# Patient Record
Sex: MALE | Race: WHITE | Employment: OTHER | URBAN - METROPOLITAN AREA
[De-identification: names, ages, dates, MRNs, and addresses within clinical notes are randomized per-mention and may not be internally consistent; named-entity substitution may affect disease eponyms.]

---

## 2021-05-25 ENCOUNTER — OFFICE VISIT (OUTPATIENT)
Dept: PRIMARY CARE CLINIC | Age: 71
End: 2021-05-25
Payer: MEDICARE

## 2021-05-25 VITALS
DIASTOLIC BLOOD PRESSURE: 65 MMHG | SYSTOLIC BLOOD PRESSURE: 111 MMHG | OXYGEN SATURATION: 97 % | HEART RATE: 70 BPM | HEIGHT: 68 IN | BODY MASS INDEX: 23.31 KG/M2 | RESPIRATION RATE: 18 BRPM | TEMPERATURE: 97.5 F | WEIGHT: 153.8 LBS

## 2021-05-25 DIAGNOSIS — E53.8 B12 DEFICIENCY: ICD-10-CM

## 2021-05-25 DIAGNOSIS — R10.11 RUQ PAIN: ICD-10-CM

## 2021-05-25 DIAGNOSIS — R10.13 EPIGASTRIC PAIN: ICD-10-CM

## 2021-05-25 DIAGNOSIS — E78.00 HYPERCHOLESTEREMIA: Primary | ICD-10-CM

## 2021-05-25 DIAGNOSIS — Z87.898 HISTORY OF ALCOHOL USE: ICD-10-CM

## 2021-05-25 DIAGNOSIS — E03.9 ACQUIRED HYPOTHYROIDISM: ICD-10-CM

## 2021-05-25 DIAGNOSIS — E51.9 VITAMIN B1 DEFICIENCY: ICD-10-CM

## 2021-05-25 DIAGNOSIS — E83.42 HYPOMAGNESEMIA: ICD-10-CM

## 2021-05-25 DIAGNOSIS — F51.01 PRIMARY INSOMNIA: ICD-10-CM

## 2021-05-25 DIAGNOSIS — K29.00 ACUTE GASTRITIS WITHOUT HEMORRHAGE, UNSPECIFIED GASTRITIS TYPE: ICD-10-CM

## 2021-05-25 DIAGNOSIS — E55.9 VITAMIN D DEFICIENCY: ICD-10-CM

## 2021-05-25 DIAGNOSIS — N40.0 BENIGN PROSTATIC HYPERPLASIA, UNSPECIFIED WHETHER LOWER URINARY TRACT SYMPTOMS PRESENT: ICD-10-CM

## 2021-05-25 PROCEDURE — 99204 OFFICE O/P NEW MOD 45 MIN: CPT | Performed by: INTERNAL MEDICINE

## 2021-05-25 PROCEDURE — G8536 NO DOC ELDER MAL SCRN: HCPCS | Performed by: INTERNAL MEDICINE

## 2021-05-25 PROCEDURE — G8420 CALC BMI NORM PARAMETERS: HCPCS | Performed by: INTERNAL MEDICINE

## 2021-05-25 PROCEDURE — G8427 DOCREV CUR MEDS BY ELIG CLIN: HCPCS | Performed by: INTERNAL MEDICINE

## 2021-05-25 PROCEDURE — G8510 SCR DEP NEG, NO PLAN REQD: HCPCS | Performed by: INTERNAL MEDICINE

## 2021-05-25 PROCEDURE — 1101F PT FALLS ASSESS-DOCD LE1/YR: CPT | Performed by: INTERNAL MEDICINE

## 2021-05-25 PROCEDURE — 3017F COLORECTAL CA SCREEN DOC REV: CPT | Performed by: INTERNAL MEDICINE

## 2021-05-25 RX ORDER — PANTOPRAZOLE SODIUM 40 MG/1
40 TABLET, DELAYED RELEASE ORAL DAILY
Qty: 30 TABLET | Refills: 1 | Status: SHIPPED | OUTPATIENT
Start: 2021-05-25 | End: 2021-09-17 | Stop reason: SDUPTHER

## 2021-05-25 NOTE — PROGRESS NOTES
Chief Complaint   Patient presents with    New Patient    Other     pain right side x 2 1/2 months, denies pain at this time        Visit Vitals  Ht 5' 8\" (1.727 m)   Wt 153 lb 12.8 oz (69.8 kg)   BMI 23.39 kg/m²

## 2021-05-25 NOTE — PROGRESS NOTES
Marely Rojas is a 69 y.o.  male and presents with     Chief Complaint   Patient presents with    New Patient    Other     pain right side x 2 1/2 months, denies pain at this time    Cholesterol Problem    Hypothyroidism    Insomnia    Benign Prostatic Hypertrophy     Pt is here to establish care. Pt moved from [facility]. Pt stopped alcohol and also stopped zetia and lipitor on April 20,2021 as he was having lot of muscle aches. Pt has RUQ pain when he eats. Pain is worse when lying down. Pt has been watching keto diet to loose wt. Pt was diagnosed with fatty liver in the past.  Pt has h/o drinking alcohol for 40 years. Pt retired and stopped drinking on May 14th, 2021. Never had EGD. Pt had colonscopy Dec 2019, - normal colonoscopy. Had liver ultrasound - fatty liver    Pt has calcium scoring and later had stress test that was neg. NO h/o CAD. Pt says he tried all kinds of meds for insomnia but it did not work. 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 Pt says he is able to sleep 7-8 hours after taking ambien 12.5 mg at hs. Pt is a Type A person. Pt has been trying keto diet and intermittent fasting to loose wt. Pts mother passed away at 80. Father used to drink alcohol and passed away at 80. NO FH for CAD and stroke. Father had mini strokes. No past medical history on file. No past surgical history on file. Current Outpatient Medications   Medication Sig    pantoprazole (PROTONIX) 40 mg tablet Take 1 Tablet by mouth daily. No current facility-administered medications for this visit.      Health Maintenance   Topic Date Due    Hepatitis C Screening  Never done    COVID-19 Vaccine (1) Never done    Lipid Screen  Never done    Colorectal Cancer Screening Combo  Never done    Pneumococcal 65+ years (2 of 2 - PPSV23) 09/03/2014    Medicare Yearly Exam  05/21/2021    Flu Vaccine (Season Ended) 09/01/2021    DTaP/Tdap/Td series (2 - Td) 02/20/2028    Shingrix Vaccine Age 50>  Completed       There is no immunization history on file for this patient. No LMP for male patient. Allergies and Intolerances:   No Known Allergies    Family History:   Family History   Problem Relation Age of Onset    Cancer Mother         non hodgkin's lymphoma       Social History:   He  reports that he has never smoked. He has never used smokeless tobacco.  He  reports previous alcohol use.             Review of Systems:   General: negative for - chills, fatigue, fever, weight change  Psych: negative for - anxiety, depression, irritability or mood swings  ENT: negative for - headaches, hearing change, nasal congestion, oral lesions, sneezing or sore throat  Heme/ Lymph: negative for - bleeding problems, bruising, pallor or swollen lymph nodes  Endo: negative for - hot flashes, polydipsia/polyuria or temperature intolerance  Resp: negative for - cough, shortness of breath or wheezing  CV: negative for - chest pain, edema or palpitations  GI: negative for - abdominal pain, change in bowel habits, constipation, diarrhea or nausea/vomiting  : negative for - dysuria, hematuria, incontinence, pelvic pain or vulvar/vaginal symptoms  MSK: negative for - joint pain, joint swelling or muscle pain  Neuro: negative for - confusion, headaches, seizures or weakness  Derm: negative for - dry skin, hair changes, rash or skin lesion changes          Physical:   Vitals:   Vitals:    05/25/21 0819   BP: 111/65   Pulse: 70   Resp: 18   Temp: 97.5 °F (36.4 °C)   TempSrc: Temporal   SpO2: 97%   Weight: 153 lb 12.8 oz (69.8 kg)   Height: 5' 8\" (1.727 m)           Exam:   HEENT- atraumatic,normocephalic, awake, oriented, well nourished  Neck - supple,no enlarged lymph nodes, no JVD, no thyromegaly  Chest- CTA, no rhonchi, no crackles  Heart- rrr, no murmurs / gallop/rub  Abdomen- soft,BS+,NT, no hepatosplenomegaly, mild right upper quadrant tenderness  Ext - no c/c/edema   Neuro- no focal deficits. Power 5/5 all extremities  Skin - warm,dry, no obvious rashes. Review of Data:   LABS:   No results found for: WBC, HGB, HCT, PLT, HGBEXT, HCTEXT, PLTEXT, HGBEXT, HCTEXT, PLTEXT  No results found for: NA, K, CL, CO2, GLU, BUN, CREA  No results found for: CHOL, CHOLX, CHLST, CHOLV, HDL, HDLP, LDL, LDLC, DLDLP, TGLX, TRIGL, TRIGP  No components found for: GPT        Impression / Plan:        ICD-10-CM ICD-9-CM    1. Hypercholesteremia  E78.00 272.0 CBC WITH AUTOMATED DIFF      METABOLIC PANEL, COMPREHENSIVE      LIPID PANEL   2. Primary insomnia  F51.01 307.42    3. Acquired hypothyroidism  E03.9 244.9 TSH 3RD GENERATION      T3, FREE      T4, FREE   4. Benign prostatic hyperplasia, unspecified whether lower urinary tract symptoms present  N40.0 600.00    5. History of alcohol use  Z87.898 V11.3    6. Hypomagnesemia  E83.42 275.2 MAGNESIUM   7. B12 deficiency  E53.8 266.2 VITAMIN B12 & FOLATE   8. Vitamin D deficiency  E55.9 268.9 VITAMIN D, 25 HYDROXY   9. Vitamin B1 deficiency  E51.9 265.1 VITAMIN B1, WHOLE BLOOD   10. RUQ pain  R10.11 789.01 US ABD COMP      LIPASE   11. Acute gastritis without hemorrhage, unspecified gastritis type  K29.00 535.00 pantoprazole (PROTONIX) 40 mg tablet   12. Epigastric pain  R10.13 789.06 URINALYSIS W/ RFLX MICROSCOPIC     Asked patient to hold vitamin B 1, magnesium, vitamin K, vitamin D, B12, co-Q10 supplements. Also asked patient to avoid statins as well as Zetia for now. Will repeat labs in 6 weeks and get baseline results. May need to restart patient on a lower dose of statins. If patient does develop symptoms of body aches and muscle aches, may consider checking creatinine kinase and aldolase levels at that point. Insomnia-patient has been on Ambien 12.5 mg for a long time. He informs that he has tried all other medications to help him sleep and does not help.   Informed patient that as per guidelines, due to his age he should be on a lower dose of Ambien to prevent any side effects. Patient mentioned that he will try cutting there is tablet in half and see if he can still be able to sleep. Right upper quadrant abdominal pain-rule out gallstones, fatty liver. However given long history of alcohol use, patient may have gastritis or duodenitis or even ulcer formation. Asked patient to try Protonix empirically. May need endoscopy if symptoms do not resolve. Explained to patient risk benefits of the medications. Advised patient to stop meds if having any side effects. Pt verbalized understanding of the instructions. I have discussed the diagnosis with the patient and the intended plan as seen in the above orders. The patient has received an after-visit summary and questions were answered concerning future plans. I have discussed medication side effects and warnings with the patient as well. I have reviewed the plan of care with the patient, accepted their input and they are in agreement with the treatment goals. Reviewed plan of care. Patient has provided input and agrees with goals. Follow-up and Dispositions    · Return in about 6 weeks (around 7/6/2021).          Ralph Reyes MD

## 2021-05-28 ENCOUNTER — TELEPHONE (OUTPATIENT)
Dept: PRIMARY CARE CLINIC | Age: 71
End: 2021-05-28

## 2021-05-28 NOTE — TELEPHONE ENCOUNTER
Patient wants to see a dermatologist to check for moles on body and podiatrist for bone spur left foot middle toe.  Patient stated he does not need a referral just the name of the specialist.

## 2021-05-28 NOTE — TELEPHONE ENCOUNTER
----- Message from Michael Cannon sent at 5/27/2021  7:46 AM EDT -----  Regarding: Dr. Cai/Telephone  General Message/Vendor Calls    Caller's first and last name:[facility]      Reason for call:name of podiatrist and dermatologist      Callback required yes/no and why:yes      Best contact number(s):[phone]      Details to clarify the request:Pt requested a call back with the name of a podiatrist and dermatologist recommended by the doctor.       Michael Cannon

## 2021-06-29 ENCOUNTER — HOSPITAL ENCOUNTER (OUTPATIENT)
Dept: ULTRASOUND IMAGING | Age: 71
Discharge: HOME | End: 2021-06-29
Attending: INTERNAL MEDICINE
Payer: MEDICARE

## 2021-06-29 DIAGNOSIS — R10.11 RUQ PAIN: ICD-10-CM

## 2021-06-29 PROCEDURE — 76705 ECHO EXAM OF ABDOMEN: CPT

## 2021-06-29 NOTE — PROGRESS NOTES
Ultrasound shows diffuse fatty infiltration of the liver. I would recommend medicated labs and I have ordered. Need to make sure he does not have liver disease including cirrhosis. There are 2 small renal cysts -we will observe.   Please make follow-up appointment in few weeks after reviewing the labs

## 2021-09-17 ENCOUNTER — OFFICE VISIT (OUTPATIENT)
Dept: PRIMARY CARE CLINIC | Age: 71
End: 2021-09-17
Payer: MEDICARE

## 2021-09-17 VITALS
RESPIRATION RATE: 18 BRPM | WEIGHT: 156 LBS | HEART RATE: 80 BPM | BODY MASS INDEX: 23.64 KG/M2 | OXYGEN SATURATION: 98 % | HEIGHT: 68 IN | SYSTOLIC BLOOD PRESSURE: 134 MMHG | TEMPERATURE: 97.5 F | DIASTOLIC BLOOD PRESSURE: 77 MMHG

## 2021-09-17 DIAGNOSIS — K29.00 ACUTE GASTRITIS WITHOUT HEMORRHAGE, UNSPECIFIED GASTRITIS TYPE: ICD-10-CM

## 2021-09-17 DIAGNOSIS — E78.00 HYPERCHOLESTEREMIA: Primary | ICD-10-CM

## 2021-09-17 DIAGNOSIS — F51.01 PRIMARY INSOMNIA: ICD-10-CM

## 2021-09-17 DIAGNOSIS — E03.9 ACQUIRED HYPOTHYROIDISM: ICD-10-CM

## 2021-09-17 PROCEDURE — 99214 OFFICE O/P EST MOD 30 MIN: CPT | Performed by: INTERNAL MEDICINE

## 2021-09-17 PROCEDURE — G8432 DEP SCR NOT DOC, RNG: HCPCS | Performed by: INTERNAL MEDICINE

## 2021-09-17 PROCEDURE — G8427 DOCREV CUR MEDS BY ELIG CLIN: HCPCS | Performed by: INTERNAL MEDICINE

## 2021-09-17 PROCEDURE — 1101F PT FALLS ASSESS-DOCD LE1/YR: CPT | Performed by: INTERNAL MEDICINE

## 2021-09-17 PROCEDURE — G8536 NO DOC ELDER MAL SCRN: HCPCS | Performed by: INTERNAL MEDICINE

## 2021-09-17 PROCEDURE — G8420 CALC BMI NORM PARAMETERS: HCPCS | Performed by: INTERNAL MEDICINE

## 2021-09-17 PROCEDURE — 3017F COLORECTAL CA SCREEN DOC REV: CPT | Performed by: INTERNAL MEDICINE

## 2021-09-17 RX ORDER — LEVOTHYROXINE AND LIOTHYRONINE 38; 9 UG/1; UG/1
60 TABLET ORAL DAILY
Qty: 90 TABLET | Refills: 1 | Status: SHIPPED | OUTPATIENT
Start: 2021-09-17 | End: 2022-07-28 | Stop reason: SDUPTHER

## 2021-09-17 RX ORDER — ZOLPIDEM TARTRATE 6.25 MG/1
6.25 TABLET, FILM COATED, EXTENDED RELEASE ORAL
Qty: 90 TABLET | Refills: 1 | Status: SHIPPED | OUTPATIENT
Start: 2021-09-17 | End: 2022-07-28 | Stop reason: SDUPTHER

## 2021-09-17 RX ORDER — EZETIMIBE 10 MG/1
10 TABLET ORAL DAILY
Qty: 90 TABLET | Refills: 1 | Status: SHIPPED | OUTPATIENT
Start: 2021-09-17

## 2021-09-17 RX ORDER — PANTOPRAZOLE SODIUM 40 MG/1
40 TABLET, DELAYED RELEASE ORAL DAILY
Qty: 90 TABLET | Refills: 1 | Status: SHIPPED | OUTPATIENT
Start: 2021-09-17 | End: 2022-07-28 | Stop reason: SDUPTHER

## 2021-09-17 NOTE — PROGRESS NOTES
Chief Complaint   Patient presents with    Abnormal Lab Results        Visit Vitals  /77 (BP 1 Location: Right upper arm, BP Patient Position: Sitting)   Pulse 80   Temp 97.5 °F (36.4 °C) (Temporal)   Resp 18   Ht 5' 8\" (1.727 m)   Wt 156 lb (70.8 kg)   SpO2 98%   BMI 23.72 kg/m²        1. Have you been to the ER, urgent care clinic since your last visit? Hospitalized since your last visit? No    2. Have you seen or consulted any other health care providers outside of the [address] [place] [address] since your last visit? Include any pap smears or colon screening.  No

## 2021-09-17 NOTE — PROGRESS NOTES
Bassam Elizalde is a 66 y.o.  male and presents with     Chief Complaint   Patient presents with    Abnormal Lab Results    Cholesterol Problem    Hypothyroidism     Pt is here for follow up. Pt had side effects  To statins. Pt used to be on statins and zetia. Pt reduced the dose of levothyroxine from 90 to 60 mcg po daily recently  Patient is TRYING fasting to lower his cholesterol. He is willing to take Zetia. Patient has tried melatonin over-the-counter to help him sleep. He says sometimes it helps sometimes it does help. He used to take Ambien 12.5 mg daily. He cut it in half and feels like it still works. He is requesting a refill on the [employer] . He has been on it for many years. He is willing to take the lower dose. No past medical history on file. No past surgical history on file. Current Outpatient Medications   Medication Sig    ezetimibe (ZETIA) 10 mg tablet Take 1 Tablet by mouth daily.  thyroid, Pork, (NP Thyroid) 60 mg tablet Take 1 Tablet by mouth daily.  zolpidem CR (Ambien CR) 6.25 mg tablet Take 1 Tablet by mouth nightly as needed for Sleep. Max Daily Amount: 6.25 mg.  pantoprazole (PROTONIX) 40 mg tablet Take 1 Tablet by mouth daily. No current facility-administered medications for this visit. Health Maintenance   Topic Date Due    COVID-19 Vaccine (1) Never done    Colorectal Cancer Screening Combo  Never done    Pneumococcal 65+ years (2 of 2 - PPSV23) 09/03/2014    Medicare Yearly Exam  Never done    Flu Vaccine (1) 09/01/2021    Lipid Screen  09/15/2026    DTaP/Tdap/Td series (2 - Td or Tdap) 02/20/2028    Hepatitis C Screening  Completed    Shingrix Vaccine Age 50>  Completed       There is no immunization history on file for this patient. No LMP for male patient.         Allergies and Intolerances:   No Known Allergies    Family History:   Family History   Problem Relation Age of Onset    Cancer Mother         non hodgkin's lymphoma Social History:   He  reports that he has never smoked. He has never used smokeless tobacco.  He  reports previous alcohol use. Review of Systems:   General: negative for - chills, fatigue, fever, weight change  Psych: negative for - anxiety, depression, irritability or mood swings  ENT: negative for - headaches, hearing change, nasal congestion, oral lesions, sneezing or sore throat  Heme/ Lymph: negative for - bleeding problems, bruising, pallor or swollen lymph nodes  Endo: negative for - hot flashes, polydipsia/polyuria or temperature intolerance  Resp: negative for - cough, shortness of breath or wheezing  CV: negative for - chest pain, edema or palpitations  GI: negative for - abdominal pain, change in bowel habits, constipation, diarrhea or nausea/vomiting  : negative for - dysuria, hematuria, incontinence, pelvic pain or vulvar/vaginal symptoms  MSK: negative for - joint pain, joint swelling or muscle pain  Neuro: negative for - confusion, headaches, seizures or weakness  Derm: negative for - dry skin, hair changes, rash or skin lesion changes          Physical:   Vitals:   Vitals:    09/17/21 0758   BP: 134/77   Pulse: 80   Resp: 18   Temp: 97.5 °F (36.4 °C)   TempSrc: Temporal   SpO2: 98%   Weight: 156 lb (70.8 kg)   Height: 5' 8\" (1.727 m)           Exam:   HEENT- atraumatic,normocephalic, awake, oriented, well nourished  Neck - supple,no enlarged lymph nodes, no JVD, no thyromegaly  Chest- CTA, no rhonchi, no crackles  Heart- rrr, no murmurs / gallop/rub  Abdomen- soft,BS+,NT, no hepatosplenomegaly  Ext - no c/c/edema   Neuro- no focal deficits. Power 5/5 all extremities  Skin - warm,dry, no obvious rashes.           Review of Data:   LABS:   Lab Results   Component Value Date/Time    WBC 3.8 (L) 09/15/2021 07:08 AM    HGB 14.4 09/15/2021 07:08 AM    HCT 43.8 09/15/2021 07:08 AM    PLATELET 930 40/45/1467 07:08 AM     Lab Results   Component Value Date/Time    Sodium 140 09/15/2021 07:08 AM    Potassium 4.1 09/15/2021 07:08 AM    Chloride 108 09/15/2021 07:08 AM    CO2 27 09/15/2021 07:08 AM    Glucose 95 09/15/2021 07:08 AM    BUN 14 09/15/2021 07:08 AM    Creatinine 0.89 09/15/2021 07:08 AM     Lab Results   Component Value Date/Time    Cholesterol, total 261 (H) 09/15/2021 07:08 AM    HDL Cholesterol 58 09/15/2021 07:08 AM    LDL, calculated 186.6 (H) 09/15/2021 07:08 AM    Triglyceride 82 09/15/2021 07:08 AM     No components found for: GPT        Impression / Plan:        ICD-10-CM ICD-9-CM    1. Hypercholesteremia  E78.00 272.0 ezetimibe (ZETIA) 10 mg tablet      LIPID PANEL      TSH 3RD GENERATION      T4, FREE      METABOLIC PANEL, COMPREHENSIVE   2. Primary insomnia  F51.01 307.42 zolpidem CR (Ambien CR) 6.25 mg tablet   3. Acquired hypothyroidism  E03.9 244.9 thyroid, Pork, (NP Thyroid) 60 mg tablet   4. Acute gastritis without hemorrhage, unspecified gastritis type  K29.00 535.00 pantoprazole (PROTONIX) 40 mg tablet         Explained to patient risk benefits of the medications. Advised patient to stop meds if having any side effects. Pt verbalized understanding of the instructions. I have discussed the diagnosis with the patient and the intended plan as seen in the above orders. The patient has received an after-visit summary and questions were answered concerning future plans. I have discussed medication side effects and warnings with the patient as well. I have reviewed the plan of care with the patient, accepted their input and they are in agreement with the treatment goals. Reviewed plan of care. Patient has provided input and agrees with goals. Follow-up and Dispositions    · Return in about 4 months (around 1/17/2022).          Amelie Abdul MD

## 2022-01-21 DIAGNOSIS — F51.01 PRIMARY INSOMNIA: ICD-10-CM

## 2022-01-21 RX ORDER — ZOLPIDEM TARTRATE 6.25 MG/1
TABLET, FILM COATED, EXTENDED RELEASE ORAL
Qty: 90 TABLET | OUTPATIENT
Start: 2022-01-21

## 2022-01-21 NOTE — TELEPHONE ENCOUNTER
Called pt, pt states the pharmacy sent this request in error. He states he doesn't need anything at this time.

## 2022-06-22 ENCOUNTER — HOSPITAL ENCOUNTER (EMERGENCY)
Age: 72
Discharge: LEFT BEFORE DISCHARGE | End: 2022-06-22
Attending: STUDENT IN AN ORGANIZED HEALTH CARE EDUCATION/TRAINING PROGRAM
Payer: MEDICARE

## 2022-06-22 ENCOUNTER — APPOINTMENT (OUTPATIENT)
Dept: CT IMAGING | Age: 72
End: 2022-06-22
Attending: STUDENT IN AN ORGANIZED HEALTH CARE EDUCATION/TRAINING PROGRAM
Payer: MEDICARE

## 2022-06-22 VITALS
OXYGEN SATURATION: 97 % | RESPIRATION RATE: 17 BRPM | TEMPERATURE: 97.5 F | DIASTOLIC BLOOD PRESSURE: 80 MMHG | SYSTOLIC BLOOD PRESSURE: 164 MMHG | BODY MASS INDEX: 24.3 KG/M2 | WEIGHT: 159.83 LBS | HEART RATE: 90 BPM

## 2022-06-22 DIAGNOSIS — G45.4 TRANSIENT GLOBAL AMNESIA: Primary | ICD-10-CM

## 2022-06-22 LAB
ALBUMIN SERPL-MCNC: 3.8 G/DL (ref 3.5–5)
ALBUMIN/GLOB SERPL: 1.2 {RATIO} (ref 1.1–2.2)
ALP SERPL-CCNC: 73 U/L (ref 45–117)
ALT SERPL-CCNC: 81 U/L (ref 12–78)
ANION GAP SERPL CALC-SCNC: 8 MMOL/L (ref 5–15)
AST SERPL-CCNC: 47 U/L (ref 15–37)
ATRIAL RATE: 74 BPM
BASOPHILS # BLD: 0 K/UL (ref 0–0.1)
BASOPHILS NFR BLD: 1 % (ref 0–1)
BILIRUB SERPL-MCNC: 0.6 MG/DL (ref 0.2–1)
BUN SERPL-MCNC: 13 MG/DL (ref 6–20)
BUN/CREAT SERPL: 12 (ref 12–20)
CALCIUM SERPL-MCNC: 8.4 MG/DL (ref 8.5–10.1)
CALCULATED P AXIS, ECG09: 79 DEGREES
CALCULATED R AXIS, ECG10: -51 DEGREES
CALCULATED T AXIS, ECG11: 51 DEGREES
CHLORIDE SERPL-SCNC: 105 MMOL/L (ref 97–108)
CO2 SERPL-SCNC: 27 MMOL/L (ref 21–32)
CREAT SERPL-MCNC: 1.07 MG/DL (ref 0.7–1.3)
DIAGNOSIS, 93000: NORMAL
DIFFERENTIAL METHOD BLD: ABNORMAL
EOSINOPHIL # BLD: 0 K/UL (ref 0–0.4)
EOSINOPHIL NFR BLD: 1 % (ref 0–7)
ERYTHROCYTE [DISTWIDTH] IN BLOOD BY AUTOMATED COUNT: 14.3 % (ref 11.5–14.5)
ETHANOL SERPL-MCNC: <10 MG/DL
GLOBULIN SER CALC-MCNC: 3.1 G/DL (ref 2–4)
GLUCOSE BLD STRIP.AUTO-MCNC: 102 MG/DL (ref 65–117)
GLUCOSE SERPL-MCNC: 102 MG/DL (ref 65–100)
HCT VFR BLD AUTO: 43 % (ref 36.6–50.3)
HGB BLD-MCNC: 14.5 G/DL (ref 12.1–17)
IMM GRANULOCYTES # BLD AUTO: 0 K/UL (ref 0–0.04)
IMM GRANULOCYTES NFR BLD AUTO: 1 % (ref 0–0.5)
LYMPHOCYTES # BLD: 1.5 K/UL (ref 0.8–3.5)
LYMPHOCYTES NFR BLD: 37 % (ref 12–49)
MCH RBC QN AUTO: 31.4 PG (ref 26–34)
MCHC RBC AUTO-ENTMCNC: 33.7 G/DL (ref 30–36.5)
MCV RBC AUTO: 93.1 FL (ref 80–99)
MONOCYTES # BLD: 0.4 K/UL (ref 0–1)
MONOCYTES NFR BLD: 9 % (ref 5–13)
NEUTS SEG # BLD: 2.1 K/UL (ref 1.8–8)
NEUTS SEG NFR BLD: 51 % (ref 32–75)
NRBC # BLD: 0 K/UL (ref 0–0.01)
NRBC BLD-RTO: 0 PER 100 WBC
P-R INTERVAL, ECG05: 138 MS
PLATELET # BLD AUTO: 146 K/UL (ref 150–400)
PMV BLD AUTO: 10.5 FL (ref 8.9–12.9)
POTASSIUM SERPL-SCNC: 3.5 MMOL/L (ref 3.5–5.1)
PROT SERPL-MCNC: 6.9 G/DL (ref 6.4–8.2)
Q-T INTERVAL, ECG07: 408 MS
QRS DURATION, ECG06: 90 MS
QTC CALCULATION (BEZET), ECG08: 452 MS
RBC # BLD AUTO: 4.62 M/UL (ref 4.1–5.7)
RBC MORPH BLD: ABNORMAL
SERVICE CMNT-IMP: NORMAL
SODIUM SERPL-SCNC: 140 MMOL/L (ref 136–145)
VENTRICULAR RATE, ECG03: 74 BPM
WBC # BLD AUTO: 4 K/UL (ref 4.1–11.1)

## 2022-06-22 PROCEDURE — 70450 CT HEAD/BRAIN W/O DYE: CPT

## 2022-06-22 PROCEDURE — 93005 ELECTROCARDIOGRAM TRACING: CPT

## 2022-06-22 PROCEDURE — 74011250636 HC RX REV CODE- 250/636: Performed by: STUDENT IN AN ORGANIZED HEALTH CARE EDUCATION/TRAINING PROGRAM

## 2022-06-22 PROCEDURE — 36415 COLL VENOUS BLD VENIPUNCTURE: CPT

## 2022-06-22 PROCEDURE — 82962 GLUCOSE BLOOD TEST: CPT

## 2022-06-22 PROCEDURE — 99285 EMERGENCY DEPT VISIT HI MDM: CPT

## 2022-06-22 PROCEDURE — 80053 COMPREHEN METABOLIC PANEL: CPT

## 2022-06-22 PROCEDURE — 82077 ASSAY SPEC XCP UR&BREATH IA: CPT

## 2022-06-22 PROCEDURE — 96374 THER/PROPH/DIAG INJ IV PUSH: CPT

## 2022-06-22 PROCEDURE — 85025 COMPLETE CBC W/AUTO DIFF WBC: CPT

## 2022-06-22 RX ORDER — LORAZEPAM 2 MG/ML
1 INJECTION, SOLUTION INTRAMUSCULAR; INTRAVENOUS ONCE
Status: COMPLETED | OUTPATIENT
Start: 2022-06-22 | End: 2022-06-22

## 2022-06-22 RX ADMIN — LORAZEPAM 1 MG: 2 INJECTION, SOLUTION INTRAMUSCULAR; INTRAVENOUS at 11:24

## 2022-06-22 NOTE — ED PROVIDER NOTES
Patient is a 74-year-old male presented emergency department for memory loss. Patient's wife who is bedside states that he got up at approximately 3 AM which is earlier than normal for him normally gets up at 5 AM they are having coffee this morning she noticed that he kept repeating questions not knowing where items were in the house seemed acutely confused patient was not somnolent but just kept repeating questions over and over. Patient presents here last well-known was at approximate 5 AM Code S level 1 called patient went emergently to CT for CT head Noncon. Teleneurology has seen and evaluated the patient feels like this is transient global amnesia recommends admission for the patient. Wife denies patient recently being ill or recent falls. Patient is very interactive and talkative in no acute distress however patient continues to ask the same questions over and over patient unable to recall day of the week, month, year.            Past Medical History:   Diagnosis Date    GERD (gastroesophageal reflux disease)        Past Surgical History:   Procedure Laterality Date    HX HERNIA REPAIR      inguinal         Family History:   Problem Relation Age of Onset    Cancer Mother         non hodgkin's lymphoma       Social History     Socioeconomic History    Marital status:      Spouse name: Not on file    Number of children: Not on file    Years of education: Not on file    Highest education level: Not on file   Occupational History    Not on file   Tobacco Use    Smoking status: Never Smoker    Smokeless tobacco: Never Used   Vaping Use    Vaping Use: Never used   Substance and Sexual Activity    Alcohol use: Not Currently    Drug use: Never    Sexual activity: Yes     Partners: Female   Other Topics Concern    Not on file   Social History Narrative    Not on file     Social Determinants of Health     Financial Resource Strain:     Difficulty of Paying Living Expenses: Not on file Food Insecurity:     Worried About Running Out of Food in the Last Year: Not on file    Cesario of Food in the Last Year: Not on file   Transportation Needs:     Lack of Transportation (Medical): Not on file    Lack of Transportation (Non-Medical): Not on file   Physical Activity:     Days of Exercise per Week: Not on file    Minutes of Exercise per Session: Not on file   Stress:     Feeling of Stress : Not on file   Social Connections:     Frequency of Communication with Friends and Family: Not on file    Frequency of Social Gatherings with Friends and Family: Not on file    Attends [employer] Services: Not on file    Active Member of [address] [place] [address] [employer] or Organizations: Not on file    Attends Club or Organization Meetings: Not on file    Marital Status: Not on file   Intimate Partner Violence:     Fear of Current or Ex-Partner: Not on file    Emotionally Abused: Not on file    Physically Abused: Not on file    Sexually Abused: Not on file   Housing Stability:     Unable to Pay for Housing in the Last Year: Not on file    Number of [place] in the Last Year: Not on file    Unstable Housing in the Last Year: Not on file         ALLERGIES: Patient has no known allergies. Review of Systems   Unable to perform ROS: Mental status change       Vitals:    06/22/22 1023 06/22/22 1108 06/22/22 1138 06/22/22 1208   BP: (!) 142/80 (!) 160/87 (!) 149/85 (!) 164/80   Pulse: 82 77 96 90   Resp: 13 17 17 17   Temp:    97.5 °F (36.4 °C)   SpO2: 96% 97% 97% 97%   Weight:                Physical Exam  Vitals and nursing note reviewed. Constitutional:       General: He is not in acute distress. Appearance: He is well-developed. He is not ill-appearing. HENT:      Head: Normocephalic and atraumatic. Eyes:      Extraocular Movements: Extraocular movements intact. Pupils: Pupils are equal, round, and reactive to light. Cardiovascular:      Rate and Rhythm: Normal rate and regular rhythm.       Heart sounds: Normal heart sounds. Pulmonary:      Effort: Pulmonary effort is normal.      Breath sounds: Normal breath sounds. Abdominal:      General: Bowel sounds are normal.      Palpations: Abdomen is soft. Musculoskeletal:      Cervical back: Normal range of motion and neck supple. Skin:     General: Skin is warm and dry. Neurological:      Mental Status: He is alert. He is confused. GCS: GCS eye subscore is 4. GCS verbal subscore is 5. GCS motor subscore is 6. Cranial Nerves: Cranial nerves are intact. Sensory: Sensation is intact. Motor: Motor function is intact. Coordination: Coordination is intact. Gait: Gait is intact. Psychiatric:         Attention and Perception: Attention normal.         Mood and Affect: Mood is anxious. Behavior: Behavior is hyperactive. Cognition and Memory: Memory is impaired. He exhibits impaired recent memory. MDM  Number of Diagnoses or Management Options  Transient global amnesia  Diagnosis management comments: Is a 78-year-old male present emergency department with concerns of transient global amnesia. Code S level 1 called patient received CT head noncontrast showing age-indeterminate lacunar infarct otherwise no acute abnormalities. Discussed with patient and family admission patient declining admission due to patient's mental status change do not feel like patient is able to decline however patient's wife who is POA states that she wants to take him home to evaluate and observe him at home. Discussed at great length with family that admission would be safer for the patient as we can continue to monitor as well as obtain MRI, possible EEG and neurology to see the patient patient's wife still declines prefers to take patient home. Procedures      Total critical care time spent exclusive of procedures:  52 min.

## 2022-06-22 NOTE — ED NOTES
Daughter of the patient came out of the room asking when the provider will be on the screen. Staff now attempting to call teleneurologist to ensure they are aware of the patient and need for evaluation.  Teleneuro reported there

## 2022-06-22 NOTE — ED NOTES
Daughter of the patient came out of the room multiple times asking when the teleneurologist would be evaluating her father. The patient's daughter was advised that someone should be coming on the screen shortly. Pt resting on stretcher, advised that the teleneurologist should be coming on the screen shortly to perform his assessment. No other needs at this time. Pt speaking clearly and verbalized understanding by saying \"OK, no problem.  Thank you\"

## 2022-06-22 NOTE — ED NOTES
Pt's wife in room and reports waking up this morning between [id number] and was acting normal and suddenly at 0500 pt \"forgot everything that happened in the morning\". Level 1 code S initiated. Dr. Thiago Puentes at bedside. 0945: emergency line code S level 1 initiated. Pt taken to CT stroke.

## 2022-06-22 NOTE — ED NOTES
Pt and wife/daughter wanting to leave AMA, Dr. Thiago Puentes spoke with pt and family and they would still like to leave AMA. Provided with discharge paperwork and copy of lab work.

## 2022-07-28 ENCOUNTER — OFFICE VISIT (OUTPATIENT)
Dept: PRIMARY CARE CLINIC | Age: 72
End: 2022-07-28
Payer: MEDICARE

## 2022-07-28 VITALS
DIASTOLIC BLOOD PRESSURE: 82 MMHG | BODY MASS INDEX: 24.07 KG/M2 | HEIGHT: 68 IN | TEMPERATURE: 96.8 F | HEART RATE: 76 BPM | OXYGEN SATURATION: 97 % | SYSTOLIC BLOOD PRESSURE: 139 MMHG | WEIGHT: 158.8 LBS | RESPIRATION RATE: 16 BRPM

## 2022-07-28 DIAGNOSIS — K29.00 ACUTE GASTRITIS WITHOUT HEMORRHAGE, UNSPECIFIED GASTRITIS TYPE: ICD-10-CM

## 2022-07-28 DIAGNOSIS — E78.00 HYPERCHOLESTEREMIA: ICD-10-CM

## 2022-07-28 DIAGNOSIS — N40.0 BENIGN PROSTATIC HYPERPLASIA, UNSPECIFIED WHETHER LOWER URINARY TRACT SYMPTOMS PRESENT: ICD-10-CM

## 2022-07-28 DIAGNOSIS — Z97.4 DOES USE HEARING AID: ICD-10-CM

## 2022-07-28 DIAGNOSIS — F51.01 PRIMARY INSOMNIA: ICD-10-CM

## 2022-07-28 DIAGNOSIS — I63.81 LACUNAR INFARCTION (HCC): ICD-10-CM

## 2022-07-28 DIAGNOSIS — Z12.5 PROSTATE CANCER SCREENING: ICD-10-CM

## 2022-07-28 DIAGNOSIS — E03.9 ACQUIRED HYPOTHYROIDISM: ICD-10-CM

## 2022-07-28 DIAGNOSIS — Z00.00 MEDICARE ANNUAL WELLNESS VISIT, INITIAL: Primary | ICD-10-CM

## 2022-07-28 PROCEDURE — 3017F COLORECTAL CA SCREEN DOC REV: CPT | Performed by: INTERNAL MEDICINE

## 2022-07-28 PROCEDURE — 99212 OFFICE O/P EST SF 10 MIN: CPT | Performed by: INTERNAL MEDICINE

## 2022-07-28 PROCEDURE — G8420 CALC BMI NORM PARAMETERS: HCPCS | Performed by: INTERNAL MEDICINE

## 2022-07-28 PROCEDURE — 1101F PT FALLS ASSESS-DOCD LE1/YR: CPT | Performed by: INTERNAL MEDICINE

## 2022-07-28 PROCEDURE — 1123F ACP DISCUSS/DSCN MKR DOCD: CPT | Performed by: INTERNAL MEDICINE

## 2022-07-28 PROCEDURE — G8432 DEP SCR NOT DOC, RNG: HCPCS | Performed by: INTERNAL MEDICINE

## 2022-07-28 PROCEDURE — G8536 NO DOC ELDER MAL SCRN: HCPCS | Performed by: INTERNAL MEDICINE

## 2022-07-28 PROCEDURE — G8427 DOCREV CUR MEDS BY ELIG CLIN: HCPCS | Performed by: INTERNAL MEDICINE

## 2022-07-28 PROCEDURE — G0438 PPPS, INITIAL VISIT: HCPCS | Performed by: INTERNAL MEDICINE

## 2022-07-28 RX ORDER — ZOLPIDEM TARTRATE 6.25 MG/1
6.25 TABLET, FILM COATED, EXTENDED RELEASE ORAL
Qty: 90 TABLET | Refills: 1 | Status: SHIPPED | OUTPATIENT
Start: 2022-07-28

## 2022-07-28 RX ORDER — FINASTERIDE 5 MG/1
5 TABLET, FILM COATED ORAL DAILY
Qty: 90 TABLET | Refills: 3 | Status: SHIPPED | OUTPATIENT
Start: 2022-07-28

## 2022-07-28 RX ORDER — LEVOTHYROXINE AND LIOTHYRONINE 38; 9 UG/1; UG/1
60 TABLET ORAL DAILY
Qty: 90 TABLET | Refills: 1 | Status: SHIPPED | OUTPATIENT
Start: 2022-07-28

## 2022-07-28 RX ORDER — FINASTERIDE 5 MG/1
5 TABLET, FILM COATED ORAL DAILY
COMMUNITY
End: 2022-07-28 | Stop reason: SDUPTHER

## 2022-07-28 RX ORDER — PANTOPRAZOLE SODIUM 40 MG/1
40 TABLET, DELAYED RELEASE ORAL DAILY
Qty: 90 TABLET | Refills: 1 | Status: SHIPPED | OUTPATIENT
Start: 2022-07-28

## 2022-07-28 RX ORDER — ASPIRIN 81 MG/1
81 TABLET ORAL DAILY
Qty: 90 TABLET | Refills: 3 | Status: SHIPPED | OUTPATIENT
Start: 2022-07-28

## 2022-07-28 NOTE — PROGRESS NOTES
Faith Nicolas is a 66 y.o.  male and presents with     Chief Complaint   Patient presents with    Annual Wellness Visit     Patient is due for Medicare wellness visit. He also needs refills on his medications. He was recently seen in the ER for an episode of transient memory loss. He was felt to have transient global amnesia    However CT of the brain showed small lacunar infarct. Patient did some lab results and cholesterol is improved. Patient has some reservations about starting a statin. Past Medical History:   Diagnosis Date    GERD (gastroesophageal reflux disease)      Past Surgical History:   Procedure Laterality Date    HX HERNIA REPAIR      inguinal     Current Outpatient Medications   Medication Sig    finasteride (PROSCAR) 5 mg tablet Take 1 Tablet by mouth in the morning. thyroid, Pork, (NP Thyroid) 60 mg tablet Take 1 Tablet by mouth in the morning. zolpidem CR (Ambien CR) 6.25 mg tablet Take 1 Tablet by mouth nightly as needed for Sleep. Max Daily Amount: 6.25 mg.    pantoprazole (PROTONIX) 40 mg tablet Take 1 Tablet by mouth in the morning. aspirin delayed-release 81 mg tablet Take 1 Tablet by mouth in the morning.    ezetimibe (ZETIA) 10 mg tablet Take 1 Tablet by mouth daily. No current facility-administered medications for this visit. Health Maintenance   Topic Date Due    COVID-19 Vaccine (1) Never done    Pneumococcal 65+ years (2 - PPSV23 or PCV20) 03/25/2016    Flu Vaccine (1) 09/01/2022    Depression Screen  07/25/2023    Medicare Yearly Exam  07/29/2023    Colorectal Cancer Screening Combo  07/10/2024    Lipid Screen  07/25/2027    DTaP/Tdap/Td series (2 - Td or Tdap) 02/20/2028    Hepatitis C Screening  Completed    Shingrix Vaccine Age 50>  Completed       There is no immunization history on file for this patient. No LMP for male patient.         Allergies and Intolerances:   No Known Allergies    Family History:   Family History   Problem Relation Age of Onset Cancer Mother         non hodgkin's lymphoma       Social History:   He  reports that he has never smoked. He has never used smokeless tobacco.  He  reports that he does not currently use alcohol. Review of Systems:   General: negative for - chills, fatigue, fever, weight change  Psych: negative for - anxiety, depression, irritability or mood swings  ENT: negative for - headaches, hearing change, nasal congestion, oral lesions, sneezing or sore throat  Heme/ Lymph: negative for - bleeding problems, bruising, pallor or swollen lymph nodes  Endo: negative for - hot flashes, polydipsia/polyuria or temperature intolerance  Resp: negative for - cough, shortness of breath or wheezing  CV: negative for - chest pain, edema or palpitations  GI: negative for - abdominal pain, change in bowel habits, constipation, diarrhea or nausea/vomiting  : negative for - dysuria, hematuria, incontinence, pelvic pain or vulvar/vaginal symptoms  MSK: negative for - joint pain, joint swelling or muscle pain  Neuro: negative for - confusion, headaches, seizures or weakness  Derm: negative for - dry skin, hair changes, rash or skin lesion changes          Physical:   Vitals:   Vitals:    07/28/22 1436   BP: 139/82   Pulse: 76   Resp: 16   Temp: 96.8 °F (36 °C)   TempSrc: Temporal   SpO2: 97%   Weight: 158 lb 12.8 oz (72 kg)   Height: 5' 8\" (1.727 m)           Exam:   HEENT- atraumatic,normocephalic, awake, oriented, well nourished  Neck - supple,no enlarged lymph nodes, no JVD, no thyromegaly  Chest- CTA, no rhonchi, no crackles  Heart- rrr, no murmurs / gallop/rub  Abdomen- soft,BS+,NT, no hepatosplenomegaly  Ext - no c/c/edema   Neuro- no focal deficits. Power 5/5 all extremities  Skin - warm,dry, no obvious rashes.           Review of Data:   LABS:   Lab Results   Component Value Date/Time    WBC 4.0 (L) 06/22/2022 10:05 AM    HGB 14.5 06/22/2022 10:05 AM    HCT 43.0 06/22/2022 10:05 AM    PLATELET 188 (L) 10/63/9177 10:05 AM Lab Results   Component Value Date/Time    Sodium 141 07/25/2022 07:02 AM    Potassium 4.0 07/25/2022 07:02 AM    Chloride 109 (H) 07/25/2022 07:02 AM    CO2 24 07/25/2022 07:02 AM    Glucose 99 07/25/2022 07:02 AM    BUN 15 07/25/2022 07:02 AM    Creatinine 0.90 07/25/2022 07:02 AM     Lab Results   Component Value Date/Time    Cholesterol, total 202 (H) 07/25/2022 07:02 AM    HDL Cholesterol 56 07/25/2022 07:02 AM    LDL, calculated 131.8 (H) 07/25/2022 07:02 AM    Triglyceride 71 07/25/2022 07:02 AM     No components found for: GPT        Impression / Plan:        ICD-10-CM ICD-9-CM    1. Medicare annual wellness visit, initial  Z00.00 V70.0       2. Prostate cancer screening  Z12.5 V76.44 PSA SCREENING (SCREENING)      3. Primary insomnia  F51.01 307.42 zolpidem CR (Ambien CR) 6.25 mg tablet      4. Hypercholesteremia  E78.00 272.0       5. Acquired hypothyroidism  E03.9 244.9 thyroid, Pork, (NP Thyroid) 60 mg tablet      6. Acute gastritis without hemorrhage, unspecified gastritis type  K29.00 535.00 pantoprazole (PROTONIX) 40 mg tablet      7. Benign prostatic hyperplasia, unspecified whether lower urinary tract symptoms present  N40.0 600.00 finasteride (PROSCAR) 5 mg tablet      8. Does use hearing aid  Z97.4 V45.89       9. Lacunar infarction ([facility] [address]  I63.81 434.91 aspirin delayed-release 81 mg tablet      REFERRAL TO NEUROLOGY        Hypercholesterolemia-patient wants to hold off on statins, recommended that given he had a infarct in the brain it would be advisable to start a statin, patient wants to think about it    Lacunar infarct in the brain as seen on CT scan-add aspirin 81 mg daily. Patient referred to neurology      Explained to patient risk benefits of the medications. Advised patient to stop meds if having any side effects. Pt verbalized understanding of the instructions. I have discussed the diagnosis with the patient and the intended plan as seen in the above orders.   The patient has received an after-visit summary and questions were answered concerning future plans. I have discussed medication side effects and warnings with the patient as well. I have reviewed the plan of care with the patient, accepted their input and they are in agreement with the treatment goals. Reviewed plan of care. Patient has provided input and agrees with goals. Follow-up and Dispositions    Return in about 1 year (around 7/28/2023). Jerone Krabbe, MD        ----------------------------------------------------    (AWV) The Initial Medicare Annual Wellness Exam PROGRESS NOTE    This is an Initial Medicare Annual Wellness Exam (AWV) (Performed 12 months after IPPE or effective date of Medicare Part B enrollment, Once in a lifetime)    I have reviewed the patient's medical history in detail and updated the computerized patient record. Radha Larkin is a 66 y.o.  male and presents for an annual wellness exam       There are no problems to display for this patient. ROS   Negative except none    All other systems reviewed and are negative. History     Past Medical History:   Diagnosis Date    GERD (gastroesophageal reflux disease)       Past Surgical History:   Procedure Laterality Date    HX HERNIA REPAIR      inguinal     Current Outpatient Medications   Medication Sig Dispense Refill    finasteride (PROSCAR) 5 mg tablet Take 1 Tablet by mouth in the morning. 90 Tablet 3    thyroid, Pork, (NP Thyroid) 60 mg tablet Take 1 Tablet by mouth in the morning. 90 Tablet 1    zolpidem CR (Ambien CR) 6.25 mg tablet Take 1 Tablet by mouth nightly as needed for Sleep. Max Daily Amount: 6.25 mg. 90 Tablet 1    pantoprazole (PROTONIX) 40 mg tablet Take 1 Tablet by mouth in the morning. 90 Tablet 1    aspirin delayed-release 81 mg tablet Take 1 Tablet by mouth in the morning. 90 Tablet 3    ezetimibe (ZETIA) 10 mg tablet Take 1 Tablet by mouth daily.  90 Tablet 1     No Known Allergies  Family History   Problem Relation Age of Onset    Cancer Mother         non hodgkin's lymphoma     Social History     Tobacco Use    Smoking status: Never    Smokeless tobacco: Never   Substance Use Topics    Alcohol use: Not Currently     There is no problem list on file for this patient. Health Maintenance History  Immunizations reviewed, dtap uptodate , pneumovaxuptodate, flu, zosteruptodate  colonoscopy:uptodate,         Depression Risk Factor Screening:      Patient Health Questionnaire (PHQ-2)   Over the last 2 weeks, how often have you been bothered by any of the following problems? Little interest or pleasure in doing things? Not at all. [0]  Feeling down, depressed, or hopeless? Not at all. [0]    Total Score: 0/6  PHQ-2 Assessment Scoring:   A score of 2 or more requires further screening with the PHQ-9    Alcohol Risk Factor Screening:     Women: On any occasion during the past 3 months, have you had more than 3 drinks containing alcohol? Do you average more than 7 drinks per week? Men: On any occasion during the past 3 months, have you had more than 4 drinks containing alcohol? Do you average more than 14 drinks per week? Functional Ability and Level of Safety:     Hearing Loss    Hearing is good. The patient wears hearing aids. Activities of Daily Living   Self-care. Requires assistance with: no ADLs    Fall Risk   No fall risk factors    Abuse Screen   Patient is not abused  None    Examination   Physical Examination  Vitals:    07/28/22 1436   BP: 139/82   Pulse: 76   Resp: 16   Temp: 96.8 °F (36 °C)   TempSrc: Temporal   SpO2: 97%   Weight: 158 lb 12.8 oz (72 kg)   Height: 5' 8\" (1.727 m)   PainSc:   0 - No pain      Body mass index is 24.15 kg/m².      Evaluation of Cognitive Function:  Mood/affect happy  Appearance:none  Family member/caregiver input:none    alert, well appearing, and in no distress    Patient Care Team:  Veronica Uribe MD as PCP - General (Internal Medicine Physician)  Isaiah Prado MD as PCP - [facility] Empaneled Provider    End-of-life planning  Advanced Directive in the case than an injury or illness causes the patient to be unable to make health care decisions    Health Care Directive or Living Will: no    Advice/Referrals/Counselling/Plan:   Education and counseling provided:  Are appropriate based on today's review and evaluation  Include in education list (weight loss, physical activity, smoking cessation, fall prevention, and nutrition)  current treatment plan is effective, no change in therapy. Brief written plan, checklist    I have discussed the diagnosis with the patient and the intended plan as seen in the above orders. The patient has received an after-visit summary and questions were answered concerning future plans. I have discussed medication side effects and warnings with the patient as well. I have reviewed the plan of care with the patient, accepted their input and they are in agreement with the treatment goals. Follow-up and Dispositions    Return in about 1 year (around 7/28/2023).            ____________________________________________________________

## 2022-07-28 NOTE — PROGRESS NOTES
Chief Complaint   Patient presents with    Annual Wellness Visit       Visit Vitals  /82 (BP 1 Location: Left upper arm, BP Patient Position: Sitting, BP Cuff Size: Small adult)   Pulse 76   Temp 96.8 °F (36 °C) (Temporal)   Resp 16   Ht 5' 8\" (1.727 m)   Wt 158 lb 12.8 oz (72 kg)   SpO2 97%   BMI 24.15 kg/m²     1. \"Have you been to the ER, urgent care clinic since your last visit? Hospitalized since your last visit? \" Confusion     2. \"Have you seen or consulted any other health care providers outside of the [address] [place] [address] since your last visit? \" Dentist

## 2022-07-31 DIAGNOSIS — R97.20 ELEVATED PSA: Primary | ICD-10-CM

## 2022-11-14 ENCOUNTER — OFFICE VISIT (OUTPATIENT)
Dept: NEUROLOGY | Age: 72
End: 2022-11-14
Payer: MEDICARE

## 2022-11-14 VITALS
BODY MASS INDEX: 23.87 KG/M2 | OXYGEN SATURATION: 98 % | SYSTOLIC BLOOD PRESSURE: 162 MMHG | WEIGHT: 157 LBS | HEART RATE: 84 BPM | DIASTOLIC BLOOD PRESSURE: 84 MMHG | RESPIRATION RATE: 18 BRPM

## 2022-11-14 DIAGNOSIS — R90.89 ABNORMAL CT OF BRAIN: Primary | ICD-10-CM

## 2022-11-14 DIAGNOSIS — G45.4 TRANSIENT GLOBAL AMNESIA: ICD-10-CM

## 2022-11-14 PROCEDURE — 99204 OFFICE O/P NEW MOD 45 MIN: CPT | Performed by: PSYCHIATRY & NEUROLOGY

## 2022-11-14 PROCEDURE — 1123F ACP DISCUSS/DSCN MKR DOCD: CPT | Performed by: PSYCHIATRY & NEUROLOGY

## 2022-11-14 PROCEDURE — 1101F PT FALLS ASSESS-DOCD LE1/YR: CPT | Performed by: PSYCHIATRY & NEUROLOGY

## 2022-11-14 PROCEDURE — G8432 DEP SCR NOT DOC, RNG: HCPCS | Performed by: PSYCHIATRY & NEUROLOGY

## 2022-11-14 PROCEDURE — G8427 DOCREV CUR MEDS BY ELIG CLIN: HCPCS | Performed by: PSYCHIATRY & NEUROLOGY

## 2022-11-14 PROCEDURE — G8536 NO DOC ELDER MAL SCRN: HCPCS | Performed by: PSYCHIATRY & NEUROLOGY

## 2022-11-14 PROCEDURE — G8420 CALC BMI NORM PARAMETERS: HCPCS | Performed by: PSYCHIATRY & NEUROLOGY

## 2022-11-14 PROCEDURE — 3017F COLORECTAL CA SCREEN DOC REV: CPT | Performed by: PSYCHIATRY & NEUROLOGY

## 2022-11-14 NOTE — PROGRESS NOTES
Chief Complaint   Patient presents with    New Patient     Referrered by PCP, concerned about possibly having \"mini strokez\" back in June 2022. HISTORY OF PRESENT ILLNESS  Lux Allen is a 67 y.o. male who was seen in the emergency department back in June of this year with what was suspected to be an episode of transient global amnesia. He woke up fine that day but within a couple of hours started acting confused. Per his wife, he could not recognize the objects in their house, could not remember what day or date it was and kept asking the same question over and over again. He was responding \"I don't know\" to most questions. He was taken to the emergency department and does not remember any of this. Symptoms persisted for about 7 to 8 hours and then his memory came back and phases. There was no associated headache, changes in vision, speech or focal motor or sensory deficits. He had CT brain done which showed an old lacunar infarct in the left periventricular white matter. He has been taking aspirin since then and also takes Zetia. Used to be on a statin but not anymore. Past Medical History:   Diagnosis Date    GERD (gastroesophageal reflux disease)      Current Outpatient Medications   Medication Sig    finasteride (PROSCAR) 5 mg tablet Take 1 Tablet by mouth in the morning. thyroid, Pork, (NP Thyroid) 60 mg tablet Take 1 Tablet by mouth in the morning. zolpidem CR (Ambien CR) 6.25 mg tablet Take 1 Tablet by mouth nightly as needed for Sleep. Max Daily Amount: 6.25 mg.    pantoprazole (PROTONIX) 40 mg tablet Take 1 Tablet by mouth in the morning. aspirin delayed-release 81 mg tablet Take 1 Tablet by mouth in the morning.    ezetimibe (ZETIA) 10 mg tablet Take 1 Tablet by mouth daily. No current facility-administered medications for this visit.      No Known Allergies  Family History   Problem Relation Age of Onset    Cancer Mother         non hodgkin's lymphoma     Social History Tobacco Use    Smoking status: Never    Smokeless tobacco: Never   Vaping Use    Vaping Use: Never used   Substance Use Topics    Alcohol use: Not Currently    Drug use: Never     Past Surgical History:   Procedure Laterality Date    HX HERNIA REPAIR      inguinal         REVIEW OF SYSTEMS  Review of Systems - History obtained from the patient  Psychological ROS: negative  ENT ROS: negative  Hematological and Lymphatic ROS: negative  Endocrine ROS: negative  Respiratory ROS: no cough, shortness of breath, or wheezing  Cardiovascular ROS: no chest pain or dyspnea on exertion  Gastrointestinal ROS: no abdominal pain, change in bowel habits, or black or bloody stools  Genito-Urinary ROS: no dysuria, trouble voiding, or hematuria  Musculoskeletal ROS: negative  Dermatological ROS: negative      PHYSICAL EXAMINATION:    Visit Vitals  BP (!) 162/84   Pulse 84   Resp 18   Wt 157 lb (71.2 kg)   SpO2 98%   BMI 23.87 kg/m²     General:  Well groomed individual in no acute distress. Neck: Supple, nontender, no bruits, no pain with resistance to active range of motion. Heart: Regular rate and rhythm. Normal S1S2. Lungs:  Equal chest expansion, no cough, no wheeze  Musculoskeletal:  Extremities revealed no edema and had full range of motion of joints. Psych:  Good mood and bright affect    NEUROLOGICAL EXAMINATION:     Mental Status:   Alert and oriented to person, place, and time with recent and remote memory intact. Attention span and concentration are normal. Speech is fluent. Cranial Nerves:    II, III, IV, VI:  Visual acuity grossly intact. Visual fields are normal.    Pupils are equal, round, and reactive to light. Extra-ocular movements are full and fluid. Fundoscopic exam was benign, no ptosis or nystagmus. V-XII: Hearing is grossly intact. Facial features are symmetric, with normal sensation and strength. The palate rises symmetrically and the tongue protrudes midline.   Sternocleidomastoids 5/5.      Motor Examination: Normal tone, bulk, and strength. 5/5 muscle strength throughout. No cogwheel rigidity or clonus present. Sensory exam:  Normal throughout to pinprick, temperature, and vibration sense. Normal proprioception. Coordination:  Finger to nose and rapid arm movement testing was normal.   No resting or intention tremor    Gait and Station:  Steady while walking on toes, heels, and with tandem walking. Normal arm swing. No Rhomberg or pronator drift. No muscle wasting or fasiculations noted. Reflexes:  DTRs 2+ throughout. Toes downgoing. LABS / IMAGING  CT Results (most recent):  Results from Hospital Encounter encounter on 06/22/22    CT CODE NEURO HEAD WO CONTRAST    Narrative  EXAM:  CT CODE NEURO HEAD WO CONTRAST    INDICATION: Sudden memory loss    COMPARISON: None. TECHNIQUE: Axial noncontrast head CT from foramen magnum to vertex. Coronal and  sagittal reformatted images were obtained. CT dose reduction was achieved  through use of a standardized protocol tailored for this examination and  automatic exposure control for dose modulation. FINDINGS:  The ventricles and sulci are age-appropriate without hydrocephalus. There is no mass effect or midline shift. There is no intracranial hemorrhage or  extra-axial fluid collection. There is no evidence for acute territorial  infarct. There is a small age-indeterminate lacunar infarct in the left  periventricular white matter. The basal cisterns are patent. The osseous structures are intact. The visualized paranasal sinuses and left  mastoid air cells are clear. There is nonspecific opacification of the right  mastoid air cells. Impression  No acute intracranial abnormality. Small age indeterminate lacunar infarct in  the left periventricular white matter. Nonspecific right mastoid air cell  opacification.     Imaging reviewed    Lab Results   Component Value Date/Time    Cholesterol, total 202 (H) 07/25/2022 07:02 AM    HDL Cholesterol 56 07/25/2022 07:02 AM    LDL-C, External 76 09/14/2020 12:00 AM    LDL, calculated 131.8 (H) 07/25/2022 07:02 AM    VLDL, calculated 14.2 07/25/2022 07:02 AM    Triglyceride 71 07/25/2022 07:02 AM    CHOL/HDL Ratio 3.6 07/25/2022 07:02 AM     Lab Results   Component Value Date/Time    WBC 4.0 (L) 06/22/2022 10:05 AM    HGB 14.5 06/22/2022 10:05 AM    HCT 43.0 06/22/2022 10:05 AM    PLATELET 586 (L) 76/58/8896 10:05 AM    MCV 93.1 06/22/2022 10:05 AM     Lab Results   Component Value Date/Time    Sodium 141 07/25/2022 07:02 AM    Potassium 4.0 07/25/2022 07:02 AM    Chloride 109 (H) 07/25/2022 07:02 AM    CO2 24 07/25/2022 07:02 AM    Anion gap 8 07/25/2022 07:02 AM    Glucose 99 07/25/2022 07:02 AM    BUN 15 07/25/2022 07:02 AM    Creatinine 0.90 07/25/2022 07:02 AM    BUN/Creatinine ratio 17 07/25/2022 07:02 AM    GFR est AA >60 07/25/2022 07:02 AM    GFR est non-AA >60 07/25/2022 07:02 AM    Calcium 9.2 07/25/2022 07:02 AM    Bilirubin, total 0.7 07/25/2022 07:02 AM    Alk. phosphatase 53 07/25/2022 07:02 AM    Protein, total 6.7 07/25/2022 07:02 AM    Albumin 4.0 07/25/2022 07:02 AM    Globulin 2.7 07/25/2022 07:02 AM    A-G Ratio 1.5 07/25/2022 07:02 AM    ALT (SGPT) 61 07/25/2022 07:02 AM    AST (SGOT) 30 07/25/2022 07:02 AM       ASSESSMENT    ICD-10-CM ICD-9-CM    1. Abnormal CT of brain  R90.89 793.0 MRI BRAIN WO CONT      EEG      DUPLEX CAROTID BILATERAL      2. Transient global amnesia  G45.4 437.7 MRI BRAIN WO CONT      EEG      DUPLEX CAROTID BILATERAL          DISCUSSION  Mr. Everette Guerra likely had an episode of transient global amnesia back in June of this year. The pathophysiology of this condition was reviewed and other mimics were discussed including TIA/focal seizure. There was a small age-indeterminate lacunar infarct seen in the left periventricular white matter.   I have recommended MRI scan of the brain for further clarification  Check carotid duplex  Check EEG  His blood pressure was noted to be elevated at 162/84 on the initial reading and the repeat reading was 143/84. Patient reports lower readings at home and should continue to monitor  May continue aspirin  I will review the above tests once completed and if they are unremarkable, no changes to management    Thank you for allowing me to participate in the care of Mr. Peri De La Rosa. Please feel free to contact me if you have any questions. I will be happy to follow to follow him along with you.       Adolph Kramer MD  Diplomate, American Board of Psychiatry & Neurology (Neurology)  [place] Board of Psychiatry & Neurology (Clinical Neurophysiology)  Diplomate, American Board of Electrodiagnostic Medicine

## 2022-11-28 ENCOUNTER — HOSPITAL ENCOUNTER (OUTPATIENT)
Dept: VASCULAR SURGERY | Age: 72
Discharge: HOME | End: 2022-11-28
Attending: PSYCHIATRY & NEUROLOGY
Payer: MEDICARE

## 2022-11-28 ENCOUNTER — HOSPITAL ENCOUNTER (OUTPATIENT)
Dept: NEUROLOGY | Age: 72
Discharge: HOME | End: 2022-11-28
Attending: PSYCHIATRY & NEUROLOGY
Payer: MEDICARE

## 2022-11-28 DIAGNOSIS — R90.89 ABNORMAL CT OF BRAIN: ICD-10-CM

## 2022-11-28 DIAGNOSIS — G45.4 TRANSIENT GLOBAL AMNESIA: ICD-10-CM

## 2022-11-28 LAB
LEFT CCA DIST DIAS: 23.7 CM/S
LEFT CCA DIST SYS: 67.7 CM/S
LEFT CCA PROX DIAS: 14.9 CM/S
LEFT CCA PROX SYS: 76.5 CM/S
LEFT ECA DIAS: 12.7 CM/S
LEFT ECA SYS: 61.1 CM/S
LEFT ICA DIST DIAS: 23.9 CM/S
LEFT ICA DIST SYS: 58.2 CM/S
LEFT ICA MID DIAS: 25.8 CM/S
LEFT ICA MID SYS: 61.6 CM/S
LEFT ICA PROX DIAS: 18.8 CM/S
LEFT ICA PROX SYS: 58.1 CM/S
LEFT ICA/CCA SYS: 0.9 NO UNITS
LEFT VERTEBRAL DIAS: 13.7 CM/S
LEFT VERTEBRAL SYS: 42.6 CM/S
RIGHT CCA DIST DIAS: 17.7 CM/S
RIGHT CCA DIST SYS: 67.3 CM/S
RIGHT CCA PROX DIAS: 16.4 CM/S
RIGHT CCA PROX SYS: 73.8 CM/S
RIGHT ECA DIAS: 17.1 CM/S
RIGHT ECA SYS: 71 CM/S
RIGHT ICA DIST DIAS: 26.7 CM/S
RIGHT ICA DIST SYS: 67.7 CM/S
RIGHT ICA MID DIAS: 17.5 CM/S
RIGHT ICA MID SYS: 46 CM/S
RIGHT ICA PROX DIAS: 10.2 CM/S
RIGHT ICA PROX SYS: 29.3 CM/S
RIGHT ICA/CCA SYS: 1 NO UNITS
RIGHT VERTEBRAL DIAS: 10.1 CM/S
RIGHT VERTEBRAL SYS: 42.4 CM/S

## 2022-11-28 PROCEDURE — 95816 EEG AWAKE AND DROWSY: CPT

## 2022-11-28 PROCEDURE — 93880 EXTRACRANIAL BILAT STUDY: CPT

## 2022-12-02 ENCOUNTER — HOSPITAL ENCOUNTER (OUTPATIENT)
Dept: MRI IMAGING | Age: 72
End: 2022-12-02
Attending: PSYCHIATRY & NEUROLOGY
Payer: MEDICARE

## 2022-12-02 DIAGNOSIS — R90.89 ABNORMAL CT OF BRAIN: ICD-10-CM

## 2022-12-02 DIAGNOSIS — G45.4 TRANSIENT GLOBAL AMNESIA: ICD-10-CM

## 2022-12-02 PROCEDURE — 70551 MRI BRAIN STEM W/O DYE: CPT

## 2022-12-09 NOTE — PROGRESS NOTES
Please inform that MRI of the brain showed white matter changes and a small old stroke as seen on previous imaging. Nothing new or active. Carotid duplex was negative. He should continue aspirin and Zetia as he is doing.

## 2022-12-12 NOTE — PROCEDURES
[address]   EEG    Name:  Kumar Garcia  MR#:  [id number]  :  1950  ACCOUNT #:  [de-identified]  DATE OF SERVICE:  2022      REQUESTING PHYSICIAN:  Liz Rosado MD    HISTORY:  The patient is a 69-year-old male who is being evaluated after an episode of transient global amnesia lasting 7-8 hours back in 2022. Brain imaging showed lacunar infarct in the left periventricular white matter. DESCRIPTION:  This is an 18-channel EEG performed on an awake, drowsy, and asleep patient. During wakefulness, the dominant posterior background rhythm consists of symmetric, well-modulated, medium voltage rhythms in the 8-9 Hz frequency range. Faster frequencies are seen in the frontal areas. Drowsiness is characterized by slowing and vertex waves in the central region. Sleep spindles were also seen for a brief period. Photic stimulation elicits a symmetric driving response. Hyperventilation did not produce any abnormalities. EEG SUMMARY:  Normal study. CLINICAL INTERPRETATION:  This is a normal EEG during awake, drowsy, and asleep states of the patient. No lateralizing or epileptiform features were noted.       MD GHAZAL [id number]  D:  2022 12:10  T:  2022 20:35  JOB #:  [id number]

## 2022-12-14 ENCOUNTER — TELEPHONE (OUTPATIENT)
Dept: NEUROLOGY | Age: 72
End: 2022-12-14

## 2022-12-14 NOTE — TELEPHONE ENCOUNTER
----- Message from Jag Lowry MD sent at 12/9/2022  1:02 PM EST -----  Please inform that MRI of the brain showed white matter changes and a small old stroke as seen on previous imaging. Nothing new or active. Carotid duplex was negative. He should continue aspirin and Zetia as he is doing.

## 2022-12-14 NOTE — TELEPHONE ENCOUNTER
Spoke with patient, contact verified. Patient made aware of MRI results per MD response. Verbalized understanding. Patient did request EEG results as well.

## 2023-01-17 DIAGNOSIS — E78.00 HYPERCHOLESTEREMIA: ICD-10-CM

## 2023-01-18 RX ORDER — EZETIMIBE 10 MG/1
10 TABLET ORAL DAILY
Qty: 90 TABLET | Refills: 1 | Status: SHIPPED | OUTPATIENT
Start: 2023-01-18

## 2023-06-13 RX ORDER — EZETIMIBE 10 MG/1
10 TABLET ORAL DAILY
Qty: 30 TABLET | Refills: 0 | Status: SHIPPED | OUTPATIENT
Start: 2023-06-13 | End: 2023-07-12 | Stop reason: SDUPTHER

## 2023-06-13 RX ORDER — THYROID 60 MG/1
60 TABLET ORAL DAILY
Qty: 30 TABLET | Refills: 0 | Status: SHIPPED | OUTPATIENT
Start: 2023-06-13 | End: 2023-08-03 | Stop reason: SDUPTHER

## 2023-06-13 NOTE — TELEPHONE ENCOUNTER
----- Message from Lev Lang MA sent at 6/9/2023  1:02 PM EDT -----  Subject: Refill Request    QUESTIONS  Name of Medication? thyroid (ARMOUR) 60 MG tablet  Patient-reported dosage and instructions? 60 MG, QD  How many days do you have left? 23  Preferred Pharmacy? [address] [place] [address] [id number]  Pharmacy phone number (if available)? [phone]  Additional Information for Provider? Has appt 8/3/23  ---------------------------------------------------------------------------  --------------,  Name of Medication? zolpidem (AMBIEN CR) 6.25 MG extended release tablet  Patient-reported dosage and instructions? 6.25 MG, 3-4 times a week   How many days do you have left? 0  Preferred Pharmacy? [address] [place] [address] [id number]  Pharmacy phone number (if available)? [phone]  ---------------------------------------------------------------------------  --------------,  Name of Medication? ezetimibe (ZETIA) 10 MG tablet  Patient-reported dosage and instructions? 10 MG,   How many days do you have left? 35  Preferred Pharmacy? [address] [place] [address] [id number]  Pharmacy phone number (if available)? [phone]  ---------------------------------------------------------------------------  --------------  CALL BACK INFO  What is the best way for the office to contact you? Do not leave any   message, patient will call back for answer  Preferred Call Back Phone Number? [phone]  ---------------------------------------------------------------------------  --------------  SCRIPT ANSWERS  Relationship to Patient?  Self

## 2023-06-13 NOTE — TELEPHONE ENCOUNTER
----- Message from Efrain Thomas MA sent at 6/9/2023  1:02 PM EDT -----  Subject: Refill Request    QUESTIONS  Name of Medication? thyroid (ARMOUR) 60 MG tablet  Patient-reported dosage and instructions? 60 MG, QD  How many days do you have left? 23  Preferred Pharmacy? [address] [place] [address] [id number]  Pharmacy phone number (if available)? [phone]  Additional Information for Provider? Has appt 8/3/23  ---------------------------------------------------------------------------  --------------,  Name of Medication? zolpidem (AMBIEN CR) 6.25 MG extended release tablet  Patient-reported dosage and instructions? 6.25 MG, 3-4 times a week   How many days do you have left? 0  Preferred Pharmacy? [address] [place] [address] [id number]  Pharmacy phone number (if available)? [phone]  ---------------------------------------------------------------------------  --------------,  Name of Medication? ezetimibe (ZETIA) 10 MG tablet  Patient-reported dosage and instructions? 10 MG,   How many days do you have left? 35  Preferred Pharmacy? [address] [place] [address] [address]  Pharmacy phone number (if available)? [phone]  ---------------------------------------------------------------------------  --------------  CALL BACK INFO  What is the best way for the office to contact you? Do not leave any   message, patient will call back for answer  Preferred Call Back Phone Number? [phone]  ---------------------------------------------------------------------------  --------------  SCRIPT ANSWERS  Relationship to Patient?  Self

## 2023-06-13 NOTE — TELEPHONE ENCOUNTER
PCP: Anderson Chi MD    Last Visit 7/28/2022   Future Appointments   Date Time Provider [address]   8/3/2023  8:30 AM Anderson Chi MD [facility]       Requested Prescriptions     Pending Prescriptions Disp Refills    ezetimibe (ZETIA) 10 MG tablet 30 tablet 0     Sig: Take 1 tablet by mouth daily    thyroid (ARMOUR) 60 MG tablet 30 tablet 0     Sig: Take 1 tablet by mouth daily         Other Comments: Last Refill

## 2023-06-15 RX ORDER — ZOLPIDEM TARTRATE 6.25 MG/1
6.25 TABLET, FILM COATED, EXTENDED RELEASE ORAL NIGHTLY PRN
Refills: 0 | OUTPATIENT
Start: 2023-06-15

## 2023-07-12 ENCOUNTER — TELEPHONE (OUTPATIENT)
Dept: PRIMARY CARE CLINIC | Facility: CLINIC | Age: 73
End: 2023-07-12

## 2023-07-12 DIAGNOSIS — E78.00 PURE HYPERCHOLESTEROLEMIA, UNSPECIFIED: Primary | ICD-10-CM

## 2023-07-12 DIAGNOSIS — M25.50 ARTHRALGIA, UNSPECIFIED JOINT: ICD-10-CM

## 2023-07-12 DIAGNOSIS — E55.9 VITAMIN D DEFICIENCY: ICD-10-CM

## 2023-07-12 DIAGNOSIS — Z12.5 PROSTATE CANCER SCREENING: ICD-10-CM

## 2023-07-12 DIAGNOSIS — E03.9 HYPOTHYROIDISM, UNSPECIFIED TYPE: ICD-10-CM

## 2023-07-12 RX ORDER — EZETIMIBE 10 MG/1
10 TABLET ORAL DAILY
Qty: 90 TABLET | Refills: 0 | Status: SHIPPED | OUTPATIENT
Start: 2023-07-12

## 2023-07-12 NOTE — TELEPHONE ENCOUNTER
PCP: Chicho Alegria MD    Last Visit 7/28/2022   Future Appointments   Date Time Provider [address]   8/3/2023  8:30 AM Chicho Alegria MD [facility] AMB       Requested Prescriptions      No prescriptions requested or ordered in this encounter         Other Comments: Last Refill

## 2023-07-12 NOTE — TELEPHONE ENCOUNTER
----- Message from [place] sent at 7/11/2023  1:37 PM EDT -----  Subject: Refill Request    QUESTIONS  Name of Medication? ezetimibe (ZETIA) 10 MG tablet  Patient-reported dosage and instructions? taken once daily   How many days do you have left? 7  Preferred Pharmacy? [address]  Pharmacy phone number (if available)? [phone]  ---------------------------------------------------------------------------  --------------  CALL BACK INFO  What is the best way for the office to contact you? OK to leave message on   voicemail  Preferred Call Back Phone Number? [phone]  ---------------------------------------------------------------------------  --------------  SCRIPT ANSWERS  Relationship to Patient?  Self

## 2023-07-12 NOTE — TELEPHONE ENCOUNTER
----- Message from Sreekanth Hinds sent at 7/11/2023  1:36 PM EDT -----  Subject: Referral Request    Reason for referral request? pt request labs for his annual labs along w   vitamin D and a C reactive protein test   Provider patient wants to be referred to(if known):     Provider Phone Number(if known):     Additional Information for Provider? pt requesting a message in ALICE App   when these are active   ---------------------------------------------------------------------------  --------------  [address]    [phone]; OK to leave message on voicemail  ---------------------------------------------------------------------------  --------------

## 2023-07-26 DIAGNOSIS — E55.9 VITAMIN D DEFICIENCY: ICD-10-CM

## 2023-07-26 DIAGNOSIS — Z12.5 PROSTATE CANCER SCREENING: ICD-10-CM

## 2023-07-26 DIAGNOSIS — M25.50 ARTHRALGIA, UNSPECIFIED JOINT: ICD-10-CM

## 2023-07-26 DIAGNOSIS — E78.00 PURE HYPERCHOLESTEROLEMIA, UNSPECIFIED: ICD-10-CM

## 2023-07-26 DIAGNOSIS — E03.9 HYPOTHYROIDISM, UNSPECIFIED TYPE: ICD-10-CM

## 2023-07-26 LAB
25(OH)D3 SERPL-MCNC: 94.3 NG/ML (ref 30–100)
ALBUMIN SERPL-MCNC: 3.9 G/DL (ref 3.5–5)
ALBUMIN/GLOB SERPL: 1.4 (ref 1.1–2.2)
ALP SERPL-CCNC: 53 U/L (ref 45–117)
ALT SERPL-CCNC: 61 U/L (ref 12–78)
ANION GAP SERPL CALC-SCNC: 4 MMOL/L (ref 5–15)
AST SERPL-CCNC: 28 U/L (ref 15–37)
BILIRUB SERPL-MCNC: 0.6 MG/DL (ref 0.2–1)
BUN SERPL-MCNC: 12 MG/DL (ref 6–20)
BUN/CREAT SERPL: 12 (ref 12–20)
CALCIUM SERPL-MCNC: 8.8 MG/DL (ref 8.5–10.1)
CHLORIDE SERPL-SCNC: 109 MMOL/L (ref 97–108)
CHOLEST SERPL-MCNC: 214 MG/DL
CO2 SERPL-SCNC: 28 MMOL/L (ref 21–32)
CREAT SERPL-MCNC: 0.97 MG/DL (ref 0.7–1.3)
CRP SERPL-MCNC: <0.29 MG/DL (ref 0–0.6)
ERYTHROCYTE [DISTWIDTH] IN BLOOD BY AUTOMATED COUNT: 12.6 % (ref 11.5–14.5)
GLOBULIN SER CALC-MCNC: 2.8 G/DL (ref 2–4)
GLUCOSE SERPL-MCNC: 91 MG/DL (ref 65–100)
HCT VFR BLD AUTO: 42.3 % (ref 36.6–50.3)
HDLC SERPL-MCNC: 54 MG/DL
HDLC SERPL: 4 (ref 0–5)
HGB BLD-MCNC: 13.8 G/DL (ref 12.1–17)
LDLC SERPL CALC-MCNC: 147.8 MG/DL (ref 0–100)
MCH RBC QN AUTO: 31.2 PG (ref 26–34)
MCHC RBC AUTO-ENTMCNC: 32.6 G/DL (ref 30–36.5)
MCV RBC AUTO: 95.7 FL (ref 80–99)
NRBC # BLD: 0 K/UL (ref 0–0.01)
NRBC BLD-RTO: 0 PER 100 WBC
PLATELET # BLD AUTO: 159 K/UL (ref 150–400)
PMV BLD AUTO: 11.5 FL (ref 8.9–12.9)
POTASSIUM SERPL-SCNC: 4.1 MMOL/L (ref 3.5–5.1)
PROT SERPL-MCNC: 6.7 G/DL (ref 6.4–8.2)
PSA SERPL-MCNC: 4 NG/ML (ref 0.01–4)
RBC # BLD AUTO: 4.42 M/UL (ref 4.1–5.7)
SODIUM SERPL-SCNC: 141 MMOL/L (ref 136–145)
T3FREE SERPL-MCNC: 4.1 PG/ML (ref 2.2–4)
T4 FREE SERPL-MCNC: 0.9 NG/DL (ref 0.8–1.5)
TRIGL SERPL-MCNC: 61 MG/DL
TSH SERPL DL<=0.05 MIU/L-ACNC: 1.74 UIU/ML (ref 0.36–3.74)
VLDLC SERPL CALC-MCNC: 12.2 MG/DL
WBC # BLD AUTO: 3.3 K/UL (ref 4.1–11.1)

## 2023-07-31 SDOH — HEALTH STABILITY: PHYSICAL HEALTH: ON AVERAGE, HOW MANY DAYS PER WEEK DO YOU ENGAGE IN MODERATE TO STRENUOUS EXERCISE (LIKE A BRISK WALK)?: 5 DAYS

## 2023-07-31 SDOH — ECONOMIC STABILITY: FOOD INSECURITY: WITHIN THE PAST 12 MONTHS, YOU WORRIED THAT YOUR FOOD WOULD RUN OUT BEFORE YOU GOT MONEY TO BUY MORE.: NEVER TRUE

## 2023-07-31 SDOH — ECONOMIC STABILITY: INCOME INSECURITY: HOW HARD IS IT FOR YOU TO PAY FOR THE VERY BASICS LIKE FOOD, HOUSING, MEDICAL CARE, AND HEATING?: NOT HARD AT ALL

## 2023-07-31 SDOH — HEALTH STABILITY: PHYSICAL HEALTH: ON AVERAGE, HOW MANY MINUTES DO YOU ENGAGE IN EXERCISE AT THIS LEVEL?: 40 MIN

## 2023-07-31 SDOH — ECONOMIC STABILITY: FOOD INSECURITY: WITHIN THE PAST 12 MONTHS, THE FOOD YOU BOUGHT JUST DIDN'T LAST AND YOU DIDN'T HAVE MONEY TO GET MORE.: NEVER TRUE

## 2023-07-31 SDOH — ECONOMIC STABILITY: TRANSPORTATION INSECURITY
IN THE PAST 12 MONTHS, HAS LACK OF TRANSPORTATION KEPT YOU FROM MEETINGS, WORK, OR FROM GETTING THINGS NEEDED FOR DAILY LIVING?: NO

## 2023-07-31 SDOH — ECONOMIC STABILITY: HOUSING INSECURITY
IN THE LAST 12 MONTHS, WAS THERE A TIME WHEN YOU DID NOT HAVE A STEADY PLACE TO SLEEP OR SLEPT IN A SHELTER (INCLUDING NOW)?: NO

## 2023-07-31 ASSESSMENT — PATIENT HEALTH QUESTIONNAIRE - PHQ9
2. FEELING DOWN, DEPRESSED OR HOPELESS: 0
1. LITTLE INTEREST OR PLEASURE IN DOING THINGS: 0
SUM OF ALL RESPONSES TO PHQ QUESTIONS 1-9: 0
SUM OF ALL RESPONSES TO PHQ9 QUESTIONS 1 & 2: 0

## 2023-07-31 ASSESSMENT — LIFESTYLE VARIABLES
HOW MANY STANDARD DRINKS CONTAINING ALCOHOL DO YOU HAVE ON A TYPICAL DAY: PATIENT DOES NOT DRINK
HOW OFTEN DO YOU HAVE A DRINK CONTAINING ALCOHOL: 2
HOW MANY STANDARD DRINKS CONTAINING ALCOHOL DO YOU HAVE ON A TYPICAL DAY: 0
HOW OFTEN DO YOU HAVE SIX OR MORE DRINKS ON ONE OCCASION: 1
HOW OFTEN DO YOU HAVE A DRINK CONTAINING ALCOHOL: MONTHLY OR LESS

## 2023-08-03 ENCOUNTER — OFFICE VISIT (OUTPATIENT)
Dept: PRIMARY CARE CLINIC | Facility: CLINIC | Age: 73
End: 2023-08-03

## 2023-08-03 VITALS
OXYGEN SATURATION: 99 % | BODY MASS INDEX: 23.64 KG/M2 | DIASTOLIC BLOOD PRESSURE: 70 MMHG | SYSTOLIC BLOOD PRESSURE: 116 MMHG | RESPIRATION RATE: 18 BRPM | TEMPERATURE: 97.9 F | HEART RATE: 69 BPM | HEIGHT: 68 IN | WEIGHT: 156 LBS

## 2023-08-03 DIAGNOSIS — E78.00 PURE HYPERCHOLESTEROLEMIA, UNSPECIFIED: ICD-10-CM

## 2023-08-03 DIAGNOSIS — Z00.00 MEDICARE ANNUAL WELLNESS VISIT, SUBSEQUENT: Primary | ICD-10-CM

## 2023-08-03 DIAGNOSIS — E03.9 HYPOTHYROIDISM, UNSPECIFIED TYPE: ICD-10-CM

## 2023-08-03 DIAGNOSIS — F51.01 PRIMARY INSOMNIA: ICD-10-CM

## 2023-08-03 RX ORDER — ZOLPIDEM TARTRATE 6.25 MG/1
6.25 TABLET, FILM COATED, EXTENDED RELEASE ORAL NIGHTLY PRN
Qty: 90 TABLET | Refills: 3 | Status: SHIPPED | OUTPATIENT
Start: 2023-08-03 | End: 2024-08-02

## 2023-08-03 RX ORDER — GLUCOSA SU 2KCL/CHONDROITIN SU 500-400 MG
CAPSULE ORAL
COMMUNITY

## 2023-08-03 RX ORDER — THIAMINE MONONITRATE (VIT B1) 100 MG
100 TABLET ORAL DAILY
COMMUNITY

## 2023-08-03 RX ORDER — EZETIMIBE 10 MG/1
10 TABLET ORAL DAILY
Qty: 90 TABLET | Refills: 3 | Status: SHIPPED | OUTPATIENT
Start: 2023-08-03

## 2023-08-03 RX ORDER — RIBOFLAVIN (VITAMIN B2) 100 MG
100 TABLET ORAL DAILY
COMMUNITY

## 2023-08-03 RX ORDER — THYROID 60 MG/1
60 TABLET ORAL DAILY
Qty: 90 TABLET | Refills: 3 | Status: SHIPPED | OUTPATIENT
Start: 2023-08-03

## 2023-08-03 ASSESSMENT — PATIENT HEALTH QUESTIONNAIRE - PHQ9
1. LITTLE INTEREST OR PLEASURE IN DOING THINGS: 0
SUM OF ALL RESPONSES TO PHQ QUESTIONS 1-9: 0
SUM OF ALL RESPONSES TO PHQ QUESTIONS 1-9: 0
2. FEELING DOWN, DEPRESSED OR HOPELESS: 0
SUM OF ALL RESPONSES TO PHQ QUESTIONS 1-9: 0
SUM OF ALL RESPONSES TO PHQ QUESTIONS 1-9: 0
SUM OF ALL RESPONSES TO PHQ9 QUESTIONS 1 & 2: 0

## 2023-08-03 ASSESSMENT — LIFESTYLE VARIABLES: HOW OFTEN DO YOU HAVE A DRINK CONTAINING ALCOHOL: NEVER

## 2024-02-01 DIAGNOSIS — F51.01 PRIMARY INSOMNIA: ICD-10-CM

## 2024-02-01 RX ORDER — ZOLPIDEM TARTRATE 6.25 MG/1
TABLET, FILM COATED, EXTENDED RELEASE ORAL
Qty: 90 TABLET | Refills: 0 | OUTPATIENT
Start: 2024-02-01

## 2024-02-01 NOTE — TELEPHONE ENCOUNTER
PCP: Diego Castañeda MD    Last Visit 8/3/2023   No future appointments.    Requested Prescriptions     Pending Prescriptions Disp Refills    zolpidem (AMBIEN CR) 6.25 MG extended release tablet [Pharmacy Med Name: Zolpidem Tartrate ER Oral Tablet Extended Release 6.25 MG] 90 tablet 0     Sig: TAKE ONE TABLET BY MOUTH IN THE EVENING AS NEEDED FOR SLEEP.         Other Comments: Last Refill

## 2024-02-07 DIAGNOSIS — F51.01 PRIMARY INSOMNIA: ICD-10-CM

## 2024-02-07 RX ORDER — ZOLPIDEM TARTRATE 6.25 MG/1
6.25 TABLET, FILM COATED, EXTENDED RELEASE ORAL NIGHTLY PRN
Qty: 90 TABLET | Refills: 0 | Status: SHIPPED | OUTPATIENT
Start: 2024-02-07 | End: 2025-02-06

## 2024-02-07 NOTE — TELEPHONE ENCOUNTER
Med refill done , I have ordered urine  drug screen that pt needs to get it done within next few weeks to be in compliance with medical board

## 2024-02-07 NOTE — TELEPHONE ENCOUNTER
Patient calling because he is unable to get his Zolpidem medication because the pharmacy  the prescription  on file. Patient is asking for new one to be sent in. Please send Zolpidem to the [id number] - [place] - [address] -  [phone] - F [phone] [[phone]]

## 2024-03-14 DIAGNOSIS — F51.01 PRIMARY INSOMNIA: ICD-10-CM

## 2024-03-19 LAB — DRUGS UR: NORMAL

## 2024-04-26 ENCOUNTER — COMMUNITY OUTREACH (OUTPATIENT)
Dept: PRIMARY CARE CLINIC | Facility: CLINIC | Age: 74
End: 2024-04-26

## 2024-07-05 ENCOUNTER — TELEPHONE (OUTPATIENT)
Dept: PRIMARY CARE CLINIC | Facility: CLINIC | Age: 74
End: 2024-07-05

## 2024-07-05 NOTE — TELEPHONE ENCOUNTER
----- Message from Ynes Macario LPN sent at 7/3/2024  7:50 AM EDT -----  Regarding: FW: [facility] Appointment Request    ----- Message -----  From: Carla Vázquez  Sent: 7/2/2024   4:29 PM EDT  To: #  Subject: E[facility] Appointment Request                          E[facility] Appointment Request    Patient needs appointment for E[facility] Appointment Type: Annual Visit.    Patient Requested Dates(s): August first week.   Patient Requested Time: Morning is fine.  Provider Name: Diego Castañeda    Reason for Appointment Request: Established Patient - No appointments available during search  --------------------------------------------------------------------------------------------------------------------------    Relationship to Patient: Self     Call Back Information: OK to leave message on voicemail  Preferred Call Back Number: Phone [phone]

## 2024-08-12 SDOH — HEALTH STABILITY: PHYSICAL HEALTH: ON AVERAGE, HOW MANY DAYS PER WEEK DO YOU ENGAGE IN MODERATE TO STRENUOUS EXERCISE (LIKE A BRISK WALK)?: 5 DAYS

## 2024-08-12 SDOH — HEALTH STABILITY: PHYSICAL HEALTH: ON AVERAGE, HOW MANY MINUTES DO YOU ENGAGE IN EXERCISE AT THIS LEVEL?: 60 MIN

## 2024-08-12 ASSESSMENT — PATIENT HEALTH QUESTIONNAIRE - PHQ9
SUM OF ALL RESPONSES TO PHQ QUESTIONS 1-9: 0
SUM OF ALL RESPONSES TO PHQ9 QUESTIONS 1 & 2: 0
1. LITTLE INTEREST OR PLEASURE IN DOING THINGS: NOT AT ALL
2. FEELING DOWN, DEPRESSED OR HOPELESS: NOT AT ALL
SUM OF ALL RESPONSES TO PHQ QUESTIONS 1-9: 0

## 2024-08-12 ASSESSMENT — LIFESTYLE VARIABLES
HOW MANY STANDARD DRINKS CONTAINING ALCOHOL DO YOU HAVE ON A TYPICAL DAY: PATIENT DOES NOT DRINK
HOW OFTEN DO YOU HAVE SIX OR MORE DRINKS ON ONE OCCASION: 1
HOW OFTEN DO YOU HAVE A DRINK CONTAINING ALCOHOL: MONTHLY OR LESS
HOW MANY STANDARD DRINKS CONTAINING ALCOHOL DO YOU HAVE ON A TYPICAL DAY: 0
HOW OFTEN DO YOU HAVE A DRINK CONTAINING ALCOHOL: 2

## 2024-08-13 ENCOUNTER — TELEPHONE (OUTPATIENT)
Dept: PRIMARY CARE CLINIC | Facility: CLINIC | Age: 74
End: 2024-08-13

## 2024-08-13 DIAGNOSIS — E03.9 ACQUIRED HYPOTHYROIDISM: ICD-10-CM

## 2024-08-13 DIAGNOSIS — F51.01 PRIMARY INSOMNIA: ICD-10-CM

## 2024-08-13 DIAGNOSIS — E78.2 MIXED HYPERLIPIDEMIA: Primary | ICD-10-CM

## 2024-08-13 DIAGNOSIS — E55.9 VITAMIN D DEFICIENCY: ICD-10-CM

## 2024-08-13 DIAGNOSIS — Z12.5 SCREENING PSA (PROSTATE SPECIFIC ANTIGEN): ICD-10-CM

## 2024-08-13 SDOH — ECONOMIC STABILITY: FOOD INSECURITY: WITHIN THE PAST 12 MONTHS, THE FOOD YOU BOUGHT JUST DIDN'T LAST AND YOU DIDN'T HAVE MONEY TO GET MORE.: NEVER TRUE

## 2024-08-13 SDOH — ECONOMIC STABILITY: FOOD INSECURITY: WITHIN THE PAST 12 MONTHS, YOU WORRIED THAT YOUR FOOD WOULD RUN OUT BEFORE YOU GOT MONEY TO BUY MORE.: NEVER TRUE

## 2024-08-13 SDOH — ECONOMIC STABILITY: INCOME INSECURITY: HOW HARD IS IT FOR YOU TO PAY FOR THE VERY BASICS LIKE FOOD, HOUSING, MEDICAL CARE, AND HEATING?: NOT HARD AT ALL

## 2024-08-13 NOTE — TELEPHONE ENCOUNTER
Patient calling because his lab orders that was placed on the 9th at say pending and the lab downstairs can not collect when it say this. Patient would like to get them done tomorrow. Please call him once lab can be completed.

## 2024-08-14 DIAGNOSIS — Z12.5 SCREENING PSA (PROSTATE SPECIFIC ANTIGEN): ICD-10-CM

## 2024-08-14 DIAGNOSIS — E55.9 VITAMIN D DEFICIENCY: ICD-10-CM

## 2024-08-14 DIAGNOSIS — E78.2 MIXED HYPERLIPIDEMIA: ICD-10-CM

## 2024-08-14 DIAGNOSIS — E03.9 ACQUIRED HYPOTHYROIDISM: ICD-10-CM

## 2024-08-14 DIAGNOSIS — F51.01 PRIMARY INSOMNIA: ICD-10-CM

## 2024-08-14 LAB
25(OH)D3 SERPL-MCNC: 76.9 NG/ML (ref 30–100)
ALBUMIN SERPL-MCNC: 3.7 G/DL (ref 3.5–5)
ALBUMIN/GLOB SERPL: 1.3 (ref 1.1–2.2)
ALP SERPL-CCNC: 53 U/L (ref 45–117)
ALT SERPL-CCNC: 51 U/L (ref 12–78)
ANION GAP SERPL CALC-SCNC: 3 MMOL/L (ref 5–15)
AST SERPL-CCNC: 21 U/L (ref 15–37)
BILIRUB SERPL-MCNC: 0.9 MG/DL (ref 0.2–1)
BUN SERPL-MCNC: 18 MG/DL (ref 6–20)
BUN/CREAT SERPL: 20 (ref 12–20)
CALCIUM SERPL-MCNC: 9 MG/DL (ref 8.5–10.1)
CHLORIDE SERPL-SCNC: 108 MMOL/L (ref 97–108)
CHOLEST SERPL-MCNC: 325 MG/DL
CO2 SERPL-SCNC: 29 MMOL/L (ref 21–32)
CREAT SERPL-MCNC: 0.91 MG/DL (ref 0.7–1.3)
ERYTHROCYTE [DISTWIDTH] IN BLOOD BY AUTOMATED COUNT: 12.5 % (ref 11.5–14.5)
GLOBULIN SER CALC-MCNC: 2.8 G/DL (ref 2–4)
GLUCOSE SERPL-MCNC: 92 MG/DL (ref 65–100)
HCT VFR BLD AUTO: 42.4 % (ref 36.6–50.3)
HDLC SERPL-MCNC: 50 MG/DL
HDLC SERPL: 6.5 (ref 0–5)
HGB BLD-MCNC: 14.2 G/DL (ref 12.1–17)
LDLC SERPL CALC-MCNC: 257.8 MG/DL (ref 0–100)
MCH RBC QN AUTO: 31.2 PG (ref 26–34)
MCHC RBC AUTO-ENTMCNC: 33.5 G/DL (ref 30–36.5)
MCV RBC AUTO: 93.2 FL (ref 80–99)
NRBC # BLD: 0 K/UL (ref 0–0.01)
NRBC BLD-RTO: 0 PER 100 WBC
PLATELET # BLD AUTO: 145 K/UL (ref 150–400)
PMV BLD AUTO: 11.4 FL (ref 8.9–12.9)
POTASSIUM SERPL-SCNC: 4.2 MMOL/L (ref 3.5–5.1)
PROT SERPL-MCNC: 6.5 G/DL (ref 6.4–8.2)
PSA SERPL-MCNC: 8.2 NG/ML (ref 0.01–4)
RBC # BLD AUTO: 4.55 M/UL (ref 4.1–5.7)
SODIUM SERPL-SCNC: 140 MMOL/L (ref 136–145)
TRIGL SERPL-MCNC: 86 MG/DL
TSH SERPL DL<=0.05 MIU/L-ACNC: 4.33 UIU/ML (ref 0.36–3.74)
VLDLC SERPL CALC-MCNC: 17.2 MG/DL
WBC # BLD AUTO: 3.1 K/UL (ref 4.1–11.1)

## 2024-08-16 ENCOUNTER — OFFICE VISIT (OUTPATIENT)
Dept: PRIMARY CARE CLINIC | Facility: CLINIC | Age: 74
End: 2024-08-16

## 2024-08-16 VITALS
HEART RATE: 76 BPM | DIASTOLIC BLOOD PRESSURE: 74 MMHG | HEIGHT: 68 IN | BODY MASS INDEX: 23.55 KG/M2 | SYSTOLIC BLOOD PRESSURE: 130 MMHG | RESPIRATION RATE: 18 BRPM | TEMPERATURE: 97.8 F | OXYGEN SATURATION: 98 % | WEIGHT: 155.4 LBS

## 2024-08-16 DIAGNOSIS — Z13.6 SCREENING FOR ISCHEMIC HEART DISEASE: Primary | ICD-10-CM

## 2024-08-16 DIAGNOSIS — F51.01 PRIMARY INSOMNIA: ICD-10-CM

## 2024-08-16 DIAGNOSIS — I10 ESSENTIAL HYPERTENSION: ICD-10-CM

## 2024-08-16 DIAGNOSIS — K29.70 GASTRITIS WITHOUT BLEEDING, UNSPECIFIED CHRONICITY, UNSPECIFIED GASTRITIS TYPE: ICD-10-CM

## 2024-08-16 DIAGNOSIS — E03.9 ACQUIRED HYPOTHYROIDISM: ICD-10-CM

## 2024-08-16 DIAGNOSIS — R97.20 ELEVATED PSA: ICD-10-CM

## 2024-08-16 DIAGNOSIS — Z23 NEED FOR VACCINATION: ICD-10-CM

## 2024-08-16 DIAGNOSIS — E78.00 HYPERCHOLESTEREMIA: ICD-10-CM

## 2024-08-16 DIAGNOSIS — Z00.00 MEDICARE ANNUAL WELLNESS VISIT, SUBSEQUENT: ICD-10-CM

## 2024-08-16 DIAGNOSIS — K76.0 FATTY LIVER: ICD-10-CM

## 2024-08-16 RX ORDER — ATORVASTATIN CALCIUM 40 MG/1
40 TABLET, FILM COATED ORAL DAILY
Qty: 90 TABLET | Refills: 3 | Status: SHIPPED | OUTPATIENT
Start: 2024-08-16

## 2024-08-16 RX ORDER — PANTOPRAZOLE SODIUM 40 MG/1
40 TABLET, DELAYED RELEASE ORAL
Qty: 90 TABLET | Refills: 1 | Status: SHIPPED | OUTPATIENT
Start: 2024-08-16

## 2024-08-16 RX ORDER — ZOLPIDEM TARTRATE 6.25 MG/1
6.25 TABLET, FILM COATED, EXTENDED RELEASE ORAL NIGHTLY PRN
Qty: 90 TABLET | Refills: 1 | Status: SHIPPED | OUTPATIENT
Start: 2024-08-16 | End: 2025-08-16

## 2024-08-16 NOTE — PROGRESS NOTES
Jair Hilton is a 73 y.o. male and presents with     Chief Complaint   Patient presents with    Medicare AWV       History of Present Illness  The patient presents for evaluation of multiple medical concerns. He is accompanied by an adult female.    He has a history of diabetes and high blood pressure. His PSA level is elevated, which he attributes to discontinuing his medication. He is currently taking supplements and finasteride to maintain his prostate level. He discontinued [place] Thyroid 3 months ago and is attempting to consume natural products. He is up-to-date on his colonoscopy. He has a fatty liver and is interested in an ultrasound of his liver in 3 months.    He has discontinued Zetia and thyroid medication, expressing dissatisfaction with the results of his recent tests. Previously, his primary care physician was Dr. Rivera in [place]. He had been taking Lipitor 40 mg for over 20 years but discontinued it 2 to 3 years ago due to concerns about long-term side effects. He recalls that Zetia was the most effective medication for him, but discontinued it 4 to 5 months ago. He underwent a calcium scoring test in [place], which resulted in a high score. A treadmill test was performed at the hospital. He experienced vomiting and abdominal pain while on Lipitor and another medication, leading him to discontinue both medications.    He has a white spot on his eye, resembling a milia, and a wart on his eyelid. His gastritis has improved significantly over the past few years with the help of a proton pump inhibitor. He requests a refill of his proton pump inhibitor and Ambien 6.25 mg, which he uses as needed.    SOCIAL HISTORY  He does not smoke.    IMMUNIZATIONS  He got his Prevnar vaccine 5 years ago with Dr. Rivera.         Past Medical History:   Diagnosis Date    GERD (gastroesophageal reflux disease)      Past Surgical History:   Procedure Laterality Date    EYE SURGERY  05/2018    Cataract

## 2024-08-16 NOTE — PATIENT INSTRUCTIONS
years to screen for glaucoma; cataracts, macular degeneration, and other eye disorders.  A preventive dental visit is recommended every 6 months.  Try to get at least 150 minutes of exercise per week or 10,000 steps per day on a pedometer .  Order or download the FREE \"Exercise & Physical Activity: Your Everyday Guide\" from [employer]. Call 1-114.206.4927 or search [employer] online.  You need 1652-4059 mg of calcium and 5237-9471 IU of vitamin D per day. It is possible to meet your calcium requirement with diet alone, but a vitamin D supplement is usually necessary to meet this goal.  When exposed to the sun, use a sunscreen that protects against both UVA and UVB radiation with an SPF of 30 or greater. Reapply every 2 to 3 hours or after sweating, drying off with a towel, or swimming.  Always wear a seat belt when traveling in a car. Always wear a helmet when riding a bicycle or motorcycle.

## 2024-08-16 NOTE — PROGRESS NOTES
Health Decision Maker has been checked with the patient   Primary Decision Maker: Andrey Hilton - Spouse - [phone]     AI form was signed    Chief Complaint   Patient presents with    Medicare AWV       \"Have you been to the ER, urgent care clinic since your last visit?  Hospitalized since your last visit?\"    NO    “Have you seen or consulted any other health care providers outside of [facility] since your last visit?”    NO      There were no vitals filed for this visit.   Depression: Not at risk (8/12/2024)    PHQ-2     PHQ-2 Score: 0              Click Here for Release of Records Request    Chart reviewed: immunizations are documented.     There is no immunization history on file for this patient.

## 2024-08-28 ENCOUNTER — HOSPITAL ENCOUNTER (OUTPATIENT)
Facility: HOSPITAL | Age: 74
Discharge: HOME | End: 2024-08-31
Payer: MEDICARE

## 2024-08-28 ENCOUNTER — HOSPITAL ENCOUNTER (OUTPATIENT)
Facility: HOSPITAL | Age: 74
Discharge: HOME | End: 2024-08-31

## 2024-08-28 DIAGNOSIS — Z13.6 SCREENING FOR ISCHEMIC HEART DISEASE: ICD-10-CM

## 2024-08-28 DIAGNOSIS — K76.0 FATTY LIVER: ICD-10-CM

## 2024-08-28 PROCEDURE — 76700 US EXAM ABDOM COMPLETE: CPT

## 2024-08-28 PROCEDURE — 75571 CT HRT W/O DYE W/CA TEST: CPT

## 2024-09-03 DIAGNOSIS — R93.1 HIGH CORONARY ARTERY CALCIUM SCORE: Primary | ICD-10-CM

## 2024-09-16 ENCOUNTER — OFFICE VISIT (OUTPATIENT)
Age: 74
End: 2024-09-16
Payer: MEDICARE

## 2024-09-16 VITALS
DIASTOLIC BLOOD PRESSURE: 70 MMHG | SYSTOLIC BLOOD PRESSURE: 112 MMHG | HEART RATE: 81 BPM | BODY MASS INDEX: 22.72 KG/M2 | WEIGHT: 153.4 LBS | OXYGEN SATURATION: 97 % | HEIGHT: 69 IN

## 2024-09-16 DIAGNOSIS — Z76.89 ESTABLISHING CARE WITH NEW DOCTOR, ENCOUNTER FOR: Primary | ICD-10-CM

## 2024-09-16 DIAGNOSIS — I10 PRIMARY HYPERTENSION: ICD-10-CM

## 2024-09-16 DIAGNOSIS — I25.10 CORONARY ARTERY DISEASE INVOLVING NATIVE CORONARY ARTERY OF NATIVE HEART WITHOUT ANGINA PECTORIS: ICD-10-CM

## 2024-09-16 DIAGNOSIS — R93.1 ELEVATED CORONARY ARTERY CALCIUM SCORE: ICD-10-CM

## 2024-09-16 DIAGNOSIS — E78.2 MIXED HYPERLIPIDEMIA: ICD-10-CM

## 2024-09-16 PROCEDURE — 93000 ELECTROCARDIOGRAM COMPLETE: CPT | Performed by: INTERNAL MEDICINE

## 2024-09-16 PROCEDURE — 3078F DIAST BP <80 MM HG: CPT | Performed by: INTERNAL MEDICINE

## 2024-09-16 PROCEDURE — 1036F TOBACCO NON-USER: CPT | Performed by: INTERNAL MEDICINE

## 2024-09-16 PROCEDURE — 1123F ACP DISCUSS/DSCN MKR DOCD: CPT | Performed by: INTERNAL MEDICINE

## 2024-09-16 PROCEDURE — G8427 DOCREV CUR MEDS BY ELIG CLIN: HCPCS | Performed by: INTERNAL MEDICINE

## 2024-09-16 PROCEDURE — G8420 CALC BMI NORM PARAMETERS: HCPCS | Performed by: INTERNAL MEDICINE

## 2024-09-16 PROCEDURE — 3074F SYST BP LT 130 MM HG: CPT | Performed by: INTERNAL MEDICINE

## 2024-09-16 PROCEDURE — 3017F COLORECTAL CA SCREEN DOC REV: CPT | Performed by: INTERNAL MEDICINE

## 2024-09-16 PROCEDURE — 99204 OFFICE O/P NEW MOD 45 MIN: CPT | Performed by: INTERNAL MEDICINE

## 2024-09-16 RX ORDER — ROSUVASTATIN CALCIUM 20 MG/1
20 TABLET, COATED ORAL NIGHTLY
Qty: 30 TABLET | Refills: 3 | Status: SHIPPED | OUTPATIENT
Start: 2024-09-16

## 2024-09-16 RX ORDER — THYROID 60 MG/1
60 TABLET ORAL DAILY
COMMUNITY

## 2024-09-16 RX ORDER — MILK THISTLE 150 MG
150 CAPSULE ORAL 2 TIMES DAILY
COMMUNITY

## 2024-09-16 RX ORDER — NIACIN 100 MG
100 TABLET ORAL
COMMUNITY

## 2024-09-16 RX ORDER — FINASTERIDE 1 MG/1
1 TABLET, FILM COATED ORAL
COMMUNITY

## 2024-09-16 RX ORDER — THIAMINE HCL 50 MG
50 TABLET ORAL DAILY
COMMUNITY

## 2024-09-16 RX ORDER — ASCORBIC ACID 125 MG
125 TABLET,CHEWABLE ORAL DAILY
COMMUNITY

## 2024-09-16 ASSESSMENT — PATIENT HEALTH QUESTIONNAIRE - PHQ9
1. LITTLE INTEREST OR PLEASURE IN DOING THINGS: NOT AT ALL
SUM OF ALL RESPONSES TO PHQ QUESTIONS 1-9: 0
SUM OF ALL RESPONSES TO PHQ9 QUESTIONS 1 & 2: 0
2. FEELING DOWN, DEPRESSED OR HOPELESS: NOT AT ALL
SUM OF ALL RESPONSES TO PHQ QUESTIONS 1-9: 0

## 2024-09-18 ENCOUNTER — TELEPHONE (OUTPATIENT)
Age: 74
End: 2024-09-18

## 2024-09-26 DIAGNOSIS — E78.2 MIXED HYPERLIPIDEMIA: ICD-10-CM

## 2024-09-28 LAB — LPA SERPL-SCNC: 20.5 NMOL/L

## 2024-10-29 ENCOUNTER — ANCILLARY PROCEDURE (OUTPATIENT)
Age: 74
End: 2024-10-29
Payer: MEDICARE

## 2024-10-29 VITALS
SYSTOLIC BLOOD PRESSURE: 130 MMHG | HEART RATE: 87 BPM | WEIGHT: 153 LBS | DIASTOLIC BLOOD PRESSURE: 70 MMHG | BODY MASS INDEX: 22.66 KG/M2 | HEIGHT: 69 IN

## 2024-10-29 DIAGNOSIS — I25.10 CORONARY ARTERY DISEASE INVOLVING NATIVE CORONARY ARTERY OF NATIVE HEART WITHOUT ANGINA PECTORIS: ICD-10-CM

## 2024-10-29 LAB
ECHO BSA: 1.84 M2
STRESS ANGINA INDEX: 0
STRESS BASELINE DIAS BP: 80 MMHG
STRESS BASELINE HR: 87 BPM
STRESS BASELINE SYS BP: 140 MMHG
STRESS ESTIMATED WORKLOAD: 17.2 METS
STRESS EXERCISE DUR MIN: 13 MIN
STRESS EXERCISE DUR SEC: 16 SEC
STRESS O2 SAT PEAK: 98 %
STRESS O2 SAT REST: 98 %
STRESS PEAK DIAS BP: 70 MMHG
STRESS PEAK SYS BP: 180 MMHG
STRESS PERCENT HR ACHIEVED: 121 %
STRESS POST PEAK HR: 176 BPM
STRESS RATE PRESSURE PRODUCT: NORMAL BPM*MMHG
STRESS TARGET HR: 145 BPM

## 2024-10-29 PROCEDURE — 93017 CV STRESS TEST TRACING ONLY: CPT | Performed by: INTERNAL MEDICINE

## 2024-11-19 ENCOUNTER — ANCILLARY PROCEDURE (OUTPATIENT)
Age: 74
End: 2024-11-19
Payer: MEDICARE

## 2024-11-19 VITALS
DIASTOLIC BLOOD PRESSURE: 76 MMHG | BODY MASS INDEX: 22.66 KG/M2 | WEIGHT: 153 LBS | SYSTOLIC BLOOD PRESSURE: 116 MMHG | HEIGHT: 69 IN

## 2024-11-19 DIAGNOSIS — I25.10 CORONARY ARTERY DISEASE INVOLVING NATIVE CORONARY ARTERY OF NATIVE HEART WITHOUT ANGINA PECTORIS: ICD-10-CM

## 2024-11-19 LAB
ECHO AO ASC DIAM: 3.6 CM
ECHO AO ASCENDING AORTA INDEX: 1.96 CM/M2
ECHO AO ROOT DIAM: 3.7 CM
ECHO AO ROOT INDEX: 2.01 CM/M2
ECHO AR MAX VEL PISA: 2.8 M/S
ECHO AV MEAN GRADIENT: 2 MMHG
ECHO AV MEAN VELOCITY: 0.7 M/S
ECHO AV PEAK GRADIENT: 4 MMHG
ECHO AV PEAK VELOCITY: 1.1 M/S
ECHO AV REGURGITANT PHT: 2684.6 MILLISECOND
ECHO AV VELOCITY RATIO: 0.91
ECHO AV VTI: 22.1 CM
ECHO BSA: 1.84 M2
ECHO EST RA PRESSURE: 3 MMHG
ECHO LA DIAMETER INDEX: 1.68 CM/M2
ECHO LA DIAMETER: 3.1 CM
ECHO LA TO AORTIC ROOT RATIO: 0.84
ECHO LA VOL A-L A2C: 42 ML (ref 18–58)
ECHO LA VOL A-L A4C: 52 ML (ref 18–58)
ECHO LA VOL BP: 48 ML (ref 18–58)
ECHO LA VOL MOD A2C: 41 ML (ref 18–58)
ECHO LA VOL MOD A4C: 50 ML (ref 18–58)
ECHO LA VOL/BSA BIPLANE: 26 ML/M2 (ref 16–34)
ECHO LA VOLUME AREA LENGTH: 50 ML
ECHO LA VOLUME INDEX A-L A2C: 23 ML/M2 (ref 16–34)
ECHO LA VOLUME INDEX A-L A4C: 28 ML/M2 (ref 16–34)
ECHO LA VOLUME INDEX AREA LENGTH: 27 ML/M2 (ref 16–34)
ECHO LA VOLUME INDEX MOD A2C: 22 ML/M2 (ref 16–34)
ECHO LA VOLUME INDEX MOD A4C: 27 ML/M2 (ref 16–34)
ECHO LV E' LATERAL VELOCITY: 8.62 CM/S
ECHO LV E' SEPTAL VELOCITY: 7.02 CM/S
ECHO LV EDV A2C: 58 ML
ECHO LV EDV A4C: 83 ML
ECHO LV EDV BP: 72 ML (ref 67–155)
ECHO LV EDV INDEX A4C: 45 ML/M2
ECHO LV EDV INDEX BP: 39 ML/M2
ECHO LV EDV NDEX A2C: 32 ML/M2
ECHO LV EJECTION FRACTION A2C: 60 %
ECHO LV EJECTION FRACTION A4C: 62 %
ECHO LV EJECTION FRACTION BIPLANE: 60 % (ref 55–100)
ECHO LV ESV A2C: 23 ML
ECHO LV ESV A4C: 32 ML
ECHO LV ESV BP: 29 ML (ref 22–58)
ECHO LV ESV INDEX A2C: 13 ML/M2
ECHO LV ESV INDEX A4C: 17 ML/M2
ECHO LV ESV INDEX BP: 16 ML/M2
ECHO LV FRACTIONAL SHORTENING: 22 % (ref 28–44)
ECHO LV INTERNAL DIMENSION DIASTOLE INDEX: 2.5 CM/M2
ECHO LV INTERNAL DIMENSION DIASTOLIC: 4.6 CM (ref 4.2–5.9)
ECHO LV INTERNAL DIMENSION SYSTOLIC INDEX: 1.96 CM/M2
ECHO LV INTERNAL DIMENSION SYSTOLIC: 3.6 CM
ECHO LV IVSD: 1 CM (ref 0.6–1)
ECHO LV MASS 2D: 158.8 G (ref 88–224)
ECHO LV MASS INDEX 2D: 86.3 G/M2 (ref 49–115)
ECHO LV POSTERIOR WALL DIASTOLIC: 1 CM (ref 0.6–1)
ECHO LV RELATIVE WALL THICKNESS RATIO: 0.43
ECHO LVOT AV VTI INDEX: 1
ECHO LVOT MEAN GRADIENT: 2 MMHG
ECHO LVOT PEAK GRADIENT: 4 MMHG
ECHO LVOT PEAK VELOCITY: 1 M/S
ECHO LVOT VTI: 22 CM
ECHO MV A VELOCITY: 0.62 M/S
ECHO MV AREA PHT: 3.1 CM2
ECHO MV E DECELERATION TIME (DT): 242.3 MS
ECHO MV E VELOCITY: 0.62 M/S
ECHO MV E/A RATIO: 1
ECHO MV E/E' LATERAL: 7.19
ECHO MV E/E' RATIO (AVERAGED): 8.01
ECHO MV E/E' SEPTAL: 8.83
ECHO MV PRESSURE HALF TIME (PHT): 70.3 MS
ECHO RA AREA 4C: 20.1 CM2
ECHO RA END SYSTOLIC VOLUME APICAL 4 CHAMBER INDEX BSA: 34 ML/M2
ECHO RA VOLUME AREA LENGTH APICAL 4 CHAMBER: 64 ML
ECHO RA VOLUME: 63 ML
ECHO RIGHT VENTRICULAR SYSTOLIC PRESSURE (RVSP): 13 MMHG
ECHO RV FREE WALL PEAK S': 8.7 CM/S
ECHO RV INTERNAL DIMENSION: 3.6 CM
ECHO RV TAPSE: 2.2 CM (ref 1.7–?)
ECHO TV REGURGITANT MAX VELOCITY: 1.6 M/S
ECHO TV REGURGITANT PEAK GRADIENT: 10 MMHG

## 2024-11-19 PROCEDURE — 93306 TTE W/DOPPLER COMPLETE: CPT | Performed by: INTERNAL MEDICINE

## 2024-11-19 NOTE — RESULT ENCOUNTER NOTE
Echo shows normal heart function with no significant valvular heart disease  Mild thickened mitral valve continue same treatment

## 2024-12-12 ENCOUNTER — OFFICE VISIT (OUTPATIENT)
Age: 74
End: 2024-12-12
Payer: MEDICARE

## 2024-12-12 VITALS
HEIGHT: 69 IN | OXYGEN SATURATION: 99 % | BODY MASS INDEX: 22.87 KG/M2 | DIASTOLIC BLOOD PRESSURE: 74 MMHG | HEART RATE: 72 BPM | WEIGHT: 154.4 LBS | SYSTOLIC BLOOD PRESSURE: 120 MMHG

## 2024-12-12 DIAGNOSIS — R93.1 ELEVATED CORONARY ARTERY CALCIUM SCORE: ICD-10-CM

## 2024-12-12 DIAGNOSIS — E78.2 MIXED HYPERLIPIDEMIA: Primary | ICD-10-CM

## 2024-12-12 PROCEDURE — 1160F RVW MEDS BY RX/DR IN RCRD: CPT | Performed by: NURSE PRACTITIONER

## 2024-12-12 PROCEDURE — G8427 DOCREV CUR MEDS BY ELIG CLIN: HCPCS | Performed by: NURSE PRACTITIONER

## 2024-12-12 PROCEDURE — 1036F TOBACCO NON-USER: CPT | Performed by: NURSE PRACTITIONER

## 2024-12-12 PROCEDURE — G8484 FLU IMMUNIZE NO ADMIN: HCPCS | Performed by: NURSE PRACTITIONER

## 2024-12-12 PROCEDURE — 1126F AMNT PAIN NOTED NONE PRSNT: CPT | Performed by: NURSE PRACTITIONER

## 2024-12-12 PROCEDURE — 99213 OFFICE O/P EST LOW 20 MIN: CPT | Performed by: NURSE PRACTITIONER

## 2024-12-12 PROCEDURE — 3017F COLORECTAL CA SCREEN DOC REV: CPT | Performed by: NURSE PRACTITIONER

## 2024-12-12 PROCEDURE — 1123F ACP DISCUSS/DSCN MKR DOCD: CPT | Performed by: NURSE PRACTITIONER

## 2024-12-12 PROCEDURE — 1159F MED LIST DOCD IN RCRD: CPT | Performed by: NURSE PRACTITIONER

## 2024-12-12 PROCEDURE — G8420 CALC BMI NORM PARAMETERS: HCPCS | Performed by: NURSE PRACTITIONER

## 2024-12-12 ASSESSMENT — PATIENT HEALTH QUESTIONNAIRE - PHQ9
SUM OF ALL RESPONSES TO PHQ9 QUESTIONS 1 & 2: 0
2. FEELING DOWN, DEPRESSED OR HOPELESS: NOT AT ALL
SUM OF ALL RESPONSES TO PHQ QUESTIONS 1-9: 0
1. LITTLE INTEREST OR PLEASURE IN DOING THINGS: NOT AT ALL
SUM OF ALL RESPONSES TO PHQ QUESTIONS 1-9: 0

## 2024-12-12 NOTE — PROGRESS NOTES
CR) 6.25 MG extended release tablet Take 1 tablet by mouth nightly as needed for Sleep. Max Daily Amount: 6.25 mg    Ascorbic Acid (VITAMIN C) 100 MG tablet Take 1 tablet by mouth daily    Coenzyme Q10 (CO Q10) 100 MG CAPS Take by mouth    aspirin 81 MG EC tablet Take 1 tablet by mouth daily     No current facility-administered medications for this visit.     I have reviewed the nurses notes, vitals, problem list, allergy list, medical history, family, social history and medications.       REVIEW OF SYMPTOMS      Please see detailed HPI above, pertinent ROS and negatives noted     PHYSICAL EXAMINATION      /74 (Site: Left Upper Arm, Position: Sitting, Cuff Size: Large Adult)   Pulse 72   Ht 1.753 m (5' 9\")   Wt 70 kg (154 lb 6.4 oz)   SpO2 99%   BMI 22.80 kg/m²       Physical Exam  HENT:      Head: Normocephalic.      Right Ear: Ear canal normal.      Left Ear: Ear canal normal.      Nose: Nose normal.      Mouth/Throat:      Mouth: Mucous membranes are moist.   Eyes:      Conjunctiva/sclera: Conjunctivae normal.   Cardiovascular:      Rate and Rhythm: Normal rate and regular rhythm.      Pulses: Normal pulses.      Heart sounds: Normal heart sounds.   Pulmonary:      Effort: Pulmonary effort is normal.      Breath sounds: Normal breath sounds.   Abdominal:      Palpations: Abdomen is soft.   Musculoskeletal:         General: Normal range of motion.   Skin:     General: Skin is warm.   Neurological:      General: No focal deficit present.      Mental Status: He is alert.   Psychiatric:         Mood and Affect: Mood normal.           CARDIAC STUDIES      11/19/24    ECHO (TTE) COMPLETE (PRN CONTRAST/BUBBLE/STRAIN/3D) 11/19/2024  6:24 PM (Final)    Interpretation Summary    Left Ventricle: Normal left ventricular systolic function with a visually estimated EF of 55 - 60%. Left ventricle size is normal. Normal wall thickness. Normal wall motion. Normal diastolic function.    Mitral Valve: Mildly thickened

## 2024-12-20 DIAGNOSIS — E03.9 ACQUIRED HYPOTHYROIDISM: ICD-10-CM

## 2024-12-20 DIAGNOSIS — E78.00 HYPERCHOLESTEREMIA: ICD-10-CM

## 2024-12-20 LAB
ALBUMIN SERPL-MCNC: 4 G/DL (ref 3.5–5)
ALBUMIN/GLOB SERPL: 1.4 (ref 1.1–2.2)
ALP SERPL-CCNC: 62 U/L (ref 45–117)
ALT SERPL-CCNC: 47 U/L (ref 12–78)
ANION GAP SERPL CALC-SCNC: 4 MMOL/L (ref 2–12)
AST SERPL-CCNC: 32 U/L (ref 15–37)
BILIRUB SERPL-MCNC: 1 MG/DL (ref 0.2–1)
BUN SERPL-MCNC: 16 MG/DL (ref 6–20)
BUN/CREAT SERPL: 16 (ref 12–20)
CALCIUM SERPL-MCNC: 8.8 MG/DL (ref 8.5–10.1)
CHLORIDE SERPL-SCNC: 106 MMOL/L (ref 97–108)
CHOLEST SERPL-MCNC: 208 MG/DL
CO2 SERPL-SCNC: 29 MMOL/L (ref 21–32)
CREAT SERPL-MCNC: 0.97 MG/DL (ref 0.7–1.3)
GLOBULIN SER CALC-MCNC: 2.8 G/DL (ref 2–4)
GLUCOSE SERPL-MCNC: 90 MG/DL (ref 65–100)
HDLC SERPL-MCNC: 70 MG/DL
HDLC SERPL: 3 (ref 0–5)
LDLC SERPL CALC-MCNC: 123.6 MG/DL (ref 0–100)
POTASSIUM SERPL-SCNC: 4.2 MMOL/L (ref 3.5–5.1)
PROT SERPL-MCNC: 6.8 G/DL (ref 6.4–8.2)
SODIUM SERPL-SCNC: 139 MMOL/L (ref 136–145)
T4 FREE SERPL-MCNC: 0.8 NG/DL (ref 0.8–1.5)
TRIGL SERPL-MCNC: 72 MG/DL
TSH SERPL DL<=0.05 MIU/L-ACNC: 1.78 UIU/ML (ref 0.36–3.74)
VLDLC SERPL CALC-MCNC: 14.4 MG/DL

## 2024-12-23 ENCOUNTER — TELEPHONE (OUTPATIENT)
Age: 74
End: 2024-12-23

## 2024-12-23 ENCOUNTER — TELEPHONE (OUTPATIENT)
Dept: PRIMARY CARE CLINIC | Facility: CLINIC | Age: 74
End: 2024-12-23

## 2024-12-23 RX ORDER — ROSUVASTATIN CALCIUM 20 MG/1
20 TABLET, COATED ORAL NIGHTLY
Qty: 90 TABLET | Refills: 3 | Status: SHIPPED | OUTPATIENT
Start: 2024-12-23

## 2024-12-23 NOTE — TELEPHONE ENCOUNTER
Patient wanted to inform Yeni that labs are available from PCP, had them done on 12/20, please advise.      Pt# [phone]

## 2024-12-23 NOTE — TELEPHONE ENCOUNTER
Patient requested a new prescription for crestor 20mg, pt would like to have a 90 day refill.         [facility]  [phone]

## 2024-12-23 NOTE — TELEPHONE ENCOUNTER
Patient calling wanting to refill is thyroid medication. Patient stated that he would like to go back to taking NP Thyroid instead of the [place].

## 2024-12-23 NOTE — TELEPHONE ENCOUNTER
Per VO of MD    LOV: Visit date not found     Future Appointments   Date Time Provider Department Center   4/7/2025  8:40 AM Dmitri Riley MD [facility] AMB       Requested Prescriptions     Signed Prescriptions Disp Refills    rosuvastatin (CRESTOR) 20 MG tablet 90 tablet 3     Sig: Take 1 tablet by mouth nightly     Authorizing Provider: DMITRI RILEY     Ordering User: SHAMA SNYDER Dr. (patient's PCP reviewed lab results with him. See lab result encounter.

## 2024-12-24 RX ORDER — THYROID 60 MG/1
60 TABLET ORAL DAILY
Qty: 30 TABLET | Refills: 0 | OUTPATIENT
Start: 2024-12-24

## 2024-12-24 NOTE — TELEPHONE ENCOUNTER
Spoke to pt aware that at this time Dr. Castañeda is out of the office until the first to the month but the message will be sent to him. Pt expressed understanding.

## 2024-12-24 NOTE — TELEPHONE ENCOUNTER
PCP: Diego Castañeda MD    Last Visit 8/16/2024   Future Appointments   Date Time Provider Department Center   4/7/2025  8:40 AM Dmitri Powers MD [facility] AM[facility]       Requested Prescriptions     Pending Prescriptions Disp Refills    thyroid (ARMOUR) 60 MG tablet 30 tablet      Sig: Take 1 tablet by mouth daily         Other Comments: Last Refill   Historical provider

## 2024-12-26 DIAGNOSIS — E03.9 ACQUIRED HYPOTHYROIDISM: Primary | ICD-10-CM

## 2024-12-26 RX ORDER — THYROID 60 MG/1
60 TABLET ORAL DAILY
Qty: 90 TABLET | Refills: 1 | Status: SHIPPED | OUTPATIENT
Start: 2024-12-26

## 2025-01-22 ENCOUNTER — TELEPHONE (OUTPATIENT)
Dept: PRIMARY CARE CLINIC | Facility: CLINIC | Age: 75
End: 2025-01-22

## 2025-01-22 NOTE — TELEPHONE ENCOUNTER
Patient aware insurance Denied  Thyroid on January 20 by [employer] NCPDP 2017     Cover My Med Key: [id number]

## 2025-02-08 DIAGNOSIS — F51.01 PRIMARY INSOMNIA: ICD-10-CM

## 2025-02-08 RX ORDER — ZOLPIDEM TARTRATE 6.25 MG/1
6.25 TABLET, FILM COATED, EXTENDED RELEASE ORAL NIGHTLY PRN
Qty: 90 TABLET | Refills: 0 | Status: SHIPPED | OUTPATIENT
Start: 2025-02-08 | End: 2026-02-08